# Patient Record
Sex: MALE | Race: OTHER | Employment: FULL TIME | URBAN - METROPOLITAN AREA
[De-identification: names, ages, dates, MRNs, and addresses within clinical notes are randomized per-mention and may not be internally consistent; named-entity substitution may affect disease eponyms.]

---

## 2017-07-14 PROBLEM — Z13.31 DEPRESSION SCREENING: Status: ACTIVE | Noted: 2017-07-14

## 2017-07-15 NOTE — PATIENT INSTRUCTIONS
Reacción Ante El Estrés [Stress Reaction]  La ansiedad (anxiety) es hien sensación que todos tenemos cuando creemos que algo rishi puede llegar a pasar. Es Marilou Schleicher respuesta normal ante el estrés y, por lo general, sólo causa hien reacción leve.  Cuando la Valentina 4) Desafortunadamente, hay muchas situaciones estresantes que no se pueden evitar. Es necesario que aprenda a MANEJAR MEJOR EL ESTRÉS. Hay varios métodos comprobados que le permitirán reducir la ansiedad.  Por ejemplo, cosas simples christi hacer ejercicio, te

## 2017-07-15 NOTE — PROGRESS NOTES
HPI:    Patient ID: Indu Lee is a 40year old male. Anxiety   This is a recurrent problem. The current episode started more than 1 year ago. The problem occurs intermittently. The problem has been waxing and waning.  Associated symptoms include abdo ALPRAZolam 0.5 MG Oral Tab Take 1 tablet (0.5 mg total) by mouth 2 (two) times daily as needed for Anxiety. Disp: 30 tablet Rfl: 2   RaNITidine HCl ([facility] RANITIDINE) 75 MG Oral Tab Take 1 tablet (75 mg total) by mouth 2 (two) times daily.  Disp: 60 tablet or pleasure in doing things (over the last two weeks)?: Several days    Feeling down, depressed, or hopeless (over the last two weeks)?: Several days    PHQ-2 SCORE: 2        . Encounter for routine laboratory testing  - CBC, PLATELET; NO DIFFERENTIAL; Fut

## 2017-08-14 ENCOUNTER — NURSE ONLY (OUTPATIENT)
Dept: FAMILY MEDICINE CLINIC | Facility: CLINIC | Age: 47
End: 2017-08-14

## 2017-08-14 DIAGNOSIS — Z00.00 LABORATORY EXAMINATION ORDERED AS PART OF A ROUTINE GENERAL MEDICAL EXAMINATION: Primary | ICD-10-CM

## 2017-08-14 LAB
APPEARANCE: CLEAR
MULTISTIX LOT#: NORMAL NUMERIC
PH, URINE: 6 (ref 4.5–8)
SPECIFIC GRAVITY: 1.02 (ref 1–1.03)
URINE-COLOR: YELLOW
UROBILINOGEN,SEMI-QN: 0.2 MG/DL (ref 0–1.9)

## 2017-08-14 PROCEDURE — 36415 COLL VENOUS BLD VENIPUNCTURE: CPT

## 2017-08-14 PROCEDURE — 81003 URINALYSIS AUTO W/O SCOPE: CPT

## 2017-08-15 LAB
AMB EXT CHOL/HDL RATIO: 3.3
AMB EXT CHOLESTEROL, TOTAL: 153 MG/DL
AMB EXT CREATININE: 0.89 MG/DL
AMB EXT GLUCOSE: 107 MG/DL
AMB EXT HDL CHOLESTEROL: 46 MG/DL
AMB EXT LDL CHOLESTEROL, DIRECT: 87 MG/DL
AMB EXT NON HDL CHOL: 107 MG/DL
AMB EXT TRIGLYCERIDES: 102 MG/DL

## 2017-08-16 DIAGNOSIS — R14.0 ABDOMINAL DISTENSION (GASEOUS): ICD-10-CM

## 2017-08-18 ENCOUNTER — OFFICE VISIT (OUTPATIENT)
Dept: FAMILY MEDICINE CLINIC | Facility: CLINIC | Age: 47
End: 2017-08-18

## 2017-08-18 VITALS
WEIGHT: 248 LBS | BODY MASS INDEX: 38.47 KG/M2 | HEIGHT: 67.5 IN | HEART RATE: 79 BPM | DIASTOLIC BLOOD PRESSURE: 70 MMHG | OXYGEN SATURATION: 97 % | SYSTOLIC BLOOD PRESSURE: 128 MMHG

## 2017-08-18 DIAGNOSIS — Z00.01 ENCOUNTER FOR ROUTINE ADULT HEALTH EXAMINATION WITH ABNORMAL FINDINGS: Primary | ICD-10-CM

## 2017-08-18 DIAGNOSIS — R14.0 ABDOMINAL DISTENSION (GASEOUS): ICD-10-CM

## 2017-08-18 DIAGNOSIS — F41.9 ANXIETY: ICD-10-CM

## 2017-08-18 DIAGNOSIS — E66.09 NON MORBID OBESITY DUE TO EXCESS CALORIES: ICD-10-CM

## 2017-08-18 DIAGNOSIS — E78.00 HYPERCHOLESTEREMIA: ICD-10-CM

## 2017-08-18 DIAGNOSIS — R73.09 ELEVATED GLUCOSE LEVEL: ICD-10-CM

## 2017-08-18 PROCEDURE — 99396 PREV VISIT EST AGE 40-64: CPT

## 2017-08-18 RX ORDER — DICYCLOMINE HYDROCHLORIDE 10 MG/1
CAPSULE ORAL
Qty: 90 CAPSULE | Refills: 0 | Status: SHIPPED | OUTPATIENT
Start: 2017-08-18 | End: 2018-03-01

## 2017-08-19 PROBLEM — R73.09 ELEVATED GLUCOSE LEVEL: Status: ACTIVE | Noted: 2017-08-19

## 2017-08-19 PROBLEM — Z00.01 ENCOUNTER FOR ROUTINE ADULT HEALTH EXAMINATION WITH ABNORMAL FINDINGS: Status: ACTIVE | Noted: 2017-08-19

## 2017-08-19 NOTE — PROGRESS NOTES
CC: Annual Physical Exam     HPI:   Carmen Corona is a 40year old male who presents for a complete physical exam. Pt denies any complaints at this time.     Wt Readings from Last 6 Encounters:  08/18/17 : 248 lb  07/14/17 : 249 lb  12/16/16 : 245 lb  09/01 socially     Occ: employed. : yes. Children: 1 daughter and 2 sons. Exercise: minimal.  Diet: watches minimally     REVIEW OF SYSTEMS:   CONSTITUTIONAL:  Denies unusual weight gain/loss, fever, chills, or fatigue.   EENT:  Eyes:  Denies eye pain, v Ears: TM's clear and intact bilaterally, no excess cerumen or erythema. Nose: patent, no nasal discharge Throat:  No tonsillar erythema or exudate. Mouth:  No oral lesions or ulcerations, good dentition.   NECK: Supple, no CLAD, no JVD, no carotid bruit, n patient indicates understanding of these issues and agrees to the plan.   The patient is asked to return in 2 months for f/u of anxiety, during the flu season for immunization, and in 1 year for annual physical exam.

## 2017-08-19 NOTE — PATIENT INSTRUCTIONS
Pautas de prevención para hombres de 36 a 52 años de [address] [facility] de detección y las vacunas son importantes para el manejo de ahmadi ricardo.  La consejería sobre ahmadi ricardo también es esencial. A continuación, verá pautas en relación con esos temas para ho Sífilis Los hombres con mayor riesgo de infección; hable con ahmadi proveedor de [employer] médica En los exámenes de rutina   Tuberculosis Los hombres con mayor riesgo de infección; hable con ahmadi proveedor de atención médica Consulte a ahmadi proveedor de atención m PCV13: hien dosis [facility] 19 y 72 años de edad (protege contra 13 tipos de bacteria neumocócica)    PPSV23: Bryant Munson a dos dosis Ignacio 60 años de [place], o hien dosis a partir de los 65 años (protege contra 23 tipos de bacteria neumocócica)   Refuerzo de téta

## 2017-10-19 DIAGNOSIS — F41.9 ANXIETY: ICD-10-CM

## 2017-10-20 RX ORDER — ESCITALOPRAM OXALATE 20 MG/1
TABLET ORAL
Qty: 90 TABLET | Refills: 0 | OUTPATIENT
Start: 2017-10-20

## 2018-01-22 ENCOUNTER — TELEPHONE (OUTPATIENT)
Dept: FAMILY MEDICINE CLINIC | Facility: CLINIC | Age: 48
End: 2018-01-22

## 2018-01-22 RX ORDER — AZITHROMYCIN 250 MG/1
TABLET, FILM COATED ORAL
Qty: 6 TABLET | Refills: 0 | Status: SHIPPED | OUTPATIENT
Start: 2018-01-22 | End: 2018-03-01 | Stop reason: ALTCHOICE

## 2018-01-22 NOTE — TELEPHONE ENCOUNTER
Pt called c/o chest and sinus congestion, cough, sore throat, fever and body aches. Pt states he has been taking otc dayquil and nyquil and has not been helping with symptoms.

## 2018-01-23 NOTE — TELEPHONE ENCOUNTER
Message left for him to call us back if needed, Dr Dea Saleem authorize a Raydell Mason to his pharmacy.

## 2018-03-01 PROBLEM — H91.93 BILATERAL HEARING LOSS: Status: ACTIVE | Noted: 2018-03-01

## 2018-03-02 NOTE — PATIENT INSTRUCTIONS
La respuesta del cuerpo a la ansiedad     La ansiedad normal es parte del sistema de defensa natural del cuerpo. Es hien alerta de que estamos ante hien amenaza desconocida, poco precisa o que proviene de nuestros propios miedos internos.  Mientras usted es La ansiedad puede convertirse en un problema cuando se vuelve difícil de controlar, ocurre por meses e interfiere con partes importantes de ahmadi teressa.  Si tiene un trastorno de ansiedad, el cuerpo responde de la manera descrita anteriormente hayden de forma hailey · Tenga presente que usted no lo puede controlar todo en hien situación. Cambie lo que pueda y deje que lo demás siga ahmadi curso. · Lupillo ejercicio: es hien forma excelente de aliviar la tensión y ayudar a que ahmadi cuerpo se relaje.   · Evite la cafeína y la corey

## 2018-03-02 NOTE — PROGRESS NOTES
HPI:    Patient ID: Eloisa Baptiste is a 56year old male. Anxiety   This is a recurrent problem. The current episode started more than 1 year ago. The problem occurs intermittently.  The problem has been waxing and waning (worsening over the past few laura negative. Current Outpatient Prescriptions:  escitalopram 20 MG Oral Tab Take 1 tablet (20 mg total) by mouth daily.  Disp: 90 tablet Rfl: 0   ALPRAZolam 0.5 MG Oral Tab Take 1 tablet (0.5 mg total) by mouth 2 (two) times daily as needed for Anx 100, and Praise yourself) reviewed and discussed with pt. Refills  for Escitalopram and Alprazolam given. 2. Bilateral hearing loss, unspecified hearing loss type  Clinical course, prognosis, and treatment options of disease process discussed with pt.

## 2018-06-17 DIAGNOSIS — F41.9 ANXIETY: ICD-10-CM

## 2018-06-18 RX ORDER — ESCITALOPRAM OXALATE 20 MG/1
20 TABLET ORAL DAILY
Qty: 15 TABLET | Refills: 0 | Status: SHIPPED
Start: 2018-06-18 | End: 2018-08-24

## 2018-06-18 NOTE — TELEPHONE ENCOUNTER
From: Perez Nuno  Sent: 6/17/2018 2:05 PM CDT  Subject: Medication Renewal Request    Perez Nuno would like a refill of the following medications:     escitalopram 20 MG Oral Tab Saul Diaz MD]    Preferred pharmacy: [place] [phone]

## 2018-08-24 ENCOUNTER — OFFICE VISIT (OUTPATIENT)
Dept: FAMILY MEDICINE CLINIC | Facility: CLINIC | Age: 48
End: 2018-08-24
Payer: COMMERCIAL

## 2018-08-24 VITALS
DIASTOLIC BLOOD PRESSURE: 72 MMHG | WEIGHT: 246 LBS | SYSTOLIC BLOOD PRESSURE: 120 MMHG | OXYGEN SATURATION: 98 % | BODY MASS INDEX: 37 KG/M2 | HEART RATE: 75 BPM

## 2018-08-24 DIAGNOSIS — R73.01 IMPAIRED FASTING GLUCOSE: ICD-10-CM

## 2018-08-24 DIAGNOSIS — L91.8 CUTANEOUS SKIN TAGS: ICD-10-CM

## 2018-08-24 DIAGNOSIS — E78.00 HYPERCHOLESTEREMIA: ICD-10-CM

## 2018-08-24 DIAGNOSIS — E66.09 NON MORBID OBESITY DUE TO EXCESS CALORIES: ICD-10-CM

## 2018-08-24 DIAGNOSIS — F41.9 ANXIETY: ICD-10-CM

## 2018-08-24 DIAGNOSIS — Z00.01 ENCOUNTER FOR ROUTINE ADULT HEALTH EXAMINATION WITH ABNORMAL FINDINGS: Primary | ICD-10-CM

## 2018-08-24 DIAGNOSIS — H91.93 BILATERAL HEARING LOSS, UNSPECIFIED HEARING LOSS TYPE: ICD-10-CM

## 2018-08-24 DIAGNOSIS — M54.2 NECK PAIN: ICD-10-CM

## 2018-08-24 DIAGNOSIS — R53.83 FATIGUE, UNSPECIFIED TYPE: ICD-10-CM

## 2018-08-24 DIAGNOSIS — Z13.31 DEPRESSION SCREENING: ICD-10-CM

## 2018-08-24 DIAGNOSIS — R14.0 ABDOMINAL DISTENSION (GASEOUS): ICD-10-CM

## 2018-08-24 PROCEDURE — 36415 COLL VENOUS BLD VENIPUNCTURE: CPT

## 2018-08-24 PROCEDURE — 99213 OFFICE O/P EST LOW 20 MIN: CPT

## 2018-08-24 PROCEDURE — 99396 PREV VISIT EST AGE 40-64: CPT

## 2018-08-24 RX ORDER — DICYCLOMINE HYDROCHLORIDE 10 MG/1
10 CAPSULE ORAL 4 TIMES DAILY PRN
Qty: 90 CAPSULE | Refills: 0 | Status: SHIPPED | OUTPATIENT
Start: 2018-08-24 | End: 2018-12-27

## 2018-08-24 RX ORDER — ESCITALOPRAM OXALATE 20 MG/1
20 TABLET ORAL DAILY
Qty: 90 TABLET | Refills: 0 | Status: SHIPPED | OUTPATIENT
Start: 2018-08-24 | End: 2018-12-27

## 2018-08-24 RX ORDER — CYCLOBENZAPRINE HCL 10 MG
10 TABLET ORAL 3 TIMES DAILY PRN
Qty: 15 TABLET | Refills: 0 | Status: SHIPPED | OUTPATIENT
Start: 2018-08-24 | End: 2018-10-17

## 2018-08-24 RX ORDER — ASPIRIN 81 MG/1
81 TABLET ORAL DAILY
Qty: 90 TABLET | Refills: 3 | Status: SHIPPED | OUTPATIENT
Start: 2018-08-24 | End: 2019-08-26

## 2018-08-24 RX ORDER — METHYLPREDNISOLONE 4 MG/1
TABLET ORAL
Qty: 1 KIT | Refills: 0 | Status: SHIPPED | OUTPATIENT
Start: 2018-08-24 | End: 2018-08-31 | Stop reason: ALTCHOICE

## 2018-08-24 NOTE — PROGRESS NOTES
CC: Annual Physical Exam     HPI:   Ariadne Waters is a 56year old male who presents for a complete physical exam. Pt complains of intermittent pain on the R side of his neck radiating to his shoulder blade for the past month.  He denies any trauma to the a Son    • Diabetes Brother    • No Known Problems Son       Social History:  Smoking status: Never Smoker                                                              Smokeless tobacco: Never Used                      Alcohol use:  Yes              Comment: 246 lb. Vital signs reviewed. Appears stated age, well groomed. Physical Exam:  GEN:  Patient is alert, awake and oriented, well developed, well nourished, no apparent distress.   HEENT:  Head:  Normocephalic, atraumatic Eyes: EOMI, PERRLA, no scleral ict (ASPIRIN EC LOW DOSE) 81 MG Oral Tab EC; Take 1 tablet (81 mg total) by mouth daily. Dispense: 90 tablet; Refill: 3    2. Neck pain  - Pt advised to take hot showers/baths and/or to apply a heating pad to affected area to help relieve his symptoms.   - met interest or pleasure in doing things (over the last two weeks)?: Not at all    Feeling down, depressed, or hopeless (over the last two weeks)?: Not at all    PHQ-2 SCORE: 0        11. Non morbid obesity due to excess calories  12.  BMI 37.0-37.9, adult  - P

## 2018-08-25 PROBLEM — E55.9 VITAMIN D DEFICIENCY: Status: ACTIVE | Noted: 2018-08-24

## 2018-08-25 LAB
ALBUMIN/GLOBULIN RATIO: 1.4 (CALC) (ref 1–2.5)
ALBUMIN: 4.2 G/DL (ref 3.6–5.1)
ALKALINE PHOSPHATASE: 65 U/L (ref 40–115)
ALT: 99 U/L (ref 9–46)
APPEARANCE: CLEAR
AST: 52 U/L (ref 10–40)
BILIRUBIN, TOTAL: 0.6 MG/DL (ref 0.2–1.2)
BILIRUBIN: NEGATIVE
BUN: 17 MG/DL (ref 7–25)
CALCIUM: 9.3 MG/DL (ref 8.6–10.3)
CARBON DIOXIDE: 20 MMOL/L (ref 20–32)
CHLORIDE: 102 MMOL/L (ref 98–110)
CHOL/HDLC RATIO: 3.1 (CALC)
CHOLESTEROL, TOTAL: 154 MG/DL
COLOR: YELLOW
CREATININE: 0.85 MG/DL (ref 0.6–1.35)
EGFR IF AFRICN AM: 121 ML/MIN/1.73M2
EGFR IF NONAFRICN AM: 104 ML/MIN/1.73M2
GLOBULIN: 2.9 G/DL (CALC) (ref 1.9–3.7)
GLUCOSE: 94 MG/DL (ref 65–99)
GLUCOSE: NEGATIVE
HDL CHOLESTEROL: 49 MG/DL
HEMATOCRIT: 42.9 % (ref 38.5–50)
HEMOGLOBIN: 14.3 G/DL (ref 13.2–17.1)
KETONES: NEGATIVE
LDL-CHOLESTEROL: 88 MG/DL (CALC)
LEUKOCYTE ESTERASE: NEGATIVE
MCH: 28.3 PG (ref 27–33)
MCHC: 33.3 G/DL (ref 32–36)
MCV: 84.8 FL (ref 80–100)
MPV: 9.8 FL (ref 7.5–12.5)
NITRITE: NEGATIVE
NON-HDL CHOLESTEROL: 105 MG/DL (CALC)
OCCULT BLOOD: NEGATIVE
PH: 6.5 (ref 5–8)
PLATELET COUNT: 297 THOUSAND/UL (ref 140–400)
POTASSIUM: 4.2 MMOL/L (ref 3.5–5.3)
PROTEIN, TOTAL: 7.1 G/DL (ref 6.1–8.1)
PROTEIN: NEGATIVE
RDW: 13.2 % (ref 11–15)
RED BLOOD CELL COUNT: 5.06 MILLION/UL (ref 4.2–5.8)
SODIUM: 135 MMOL/L (ref 135–146)
SPECIFIC GRAVITY: 1.01 (ref 1–1.03)
TRIGLYCERIDES: 79 MG/DL
TSH W/REFLEX TO FT4: 3.75 MIU/L (ref 0.4–4.5)
VITAMIN D, 25-OH, TOTAL: 14 NG/ML (ref 30–100)
WHITE BLOOD CELL COUNT: 8.2 THOUSAND/UL (ref 3.8–10.8)

## 2018-08-25 NOTE — PATIENT INSTRUCTIONS
Pautas de prevención para hombres de 36 a 52 años de [address] [facility] de detección y las vacunas son importantes para el manejo de ahmadi ricardo.  La consejería sobre ahmadi ricardo también es esencial. A continuación, verá pautas en relación con esos temas para ho Sífilis Los hombres con mayor riesgo de infección; hable con ahmadi proveedor de [employer] médica En los exámenes de rutina   Tuberculosis Los hombres con mayor riesgo de infección; hable con ahmadi proveedor de atención médica Consulte a ahmadi proveedor de atención m PCV13: hien dosis [facility] 19 y 72 años de edad (protege contra 13 tipos de bacteria neumocócica)    PPSV23: Marybeth Keaton a dos dosis [employer] 60 años de [place], o hien dosis a partir de los 65 años (protege contra 23 tipos de bacteria neumocócica)   Refuerzo de téta

## 2018-08-27 ENCOUNTER — TELEPHONE (OUTPATIENT)
Dept: FAMILY MEDICINE CLINIC | Facility: CLINIC | Age: 48
End: 2018-08-27

## 2018-08-27 RX ORDER — CLOTRIMAZOLE AND BETAMETHASONE DIPROPIONATE 10; .64 MG/G; MG/G
1 CREAM TOPICAL 2 TIMES DAILY
Qty: 15 G | Refills: 1 | Status: SHIPPED | OUTPATIENT
Start: 2018-08-27 | End: 2018-12-27 | Stop reason: ALTCHOICE

## 2018-08-27 NOTE — TELEPHONE ENCOUNTER
----- Message from Catherine Zendejas MD sent at 8/25/2018  5:21 PM CDT -----  Start Ergocalciferol 2500units PO daily, medication e-prescribed, repeat 25-OH vitamin D in 3 months; ok to file.

## 2018-08-31 ENCOUNTER — OFFICE VISIT (OUTPATIENT)
Dept: FAMILY MEDICINE CLINIC | Facility: CLINIC | Age: 48
End: 2018-08-31
Payer: COMMERCIAL

## 2018-08-31 VITALS
BODY MASS INDEX: 38.16 KG/M2 | SYSTOLIC BLOOD PRESSURE: 114 MMHG | HEIGHT: 67.5 IN | DIASTOLIC BLOOD PRESSURE: 70 MMHG | HEART RATE: 87 BPM | OXYGEN SATURATION: 97 % | WEIGHT: 246 LBS

## 2018-08-31 DIAGNOSIS — L91.8 CUTANEOUS SKIN TAGS: Primary | ICD-10-CM

## 2018-08-31 PROBLEM — R53.83 FATIGUE: Status: RESOLVED | Noted: 2018-08-24 | Resolved: 2018-08-31

## 2018-08-31 PROBLEM — Z00.01 ENCOUNTER FOR ROUTINE ADULT HEALTH EXAMINATION WITH ABNORMAL FINDINGS: Status: RESOLVED | Noted: 2017-08-19 | Resolved: 2018-08-31

## 2018-08-31 PROBLEM — Z13.31 DEPRESSION SCREENING: Status: RESOLVED | Noted: 2017-07-14 | Resolved: 2018-08-31

## 2018-08-31 PROBLEM — M54.2 NECK PAIN: Status: RESOLVED | Noted: 2018-08-24 | Resolved: 2018-08-31

## 2018-08-31 PROCEDURE — 11200 RMVL SKIN TAGS UP TO&INC 15: CPT

## 2018-08-31 PROCEDURE — 11201 RMVL SKIN TAGS EA ADDL 10: CPT

## 2018-09-01 NOTE — PROCEDURES
CHIEF COMPLAINT   Patient presents for skin tag removal  HPI     VITALS   /70 (BP Location: Left arm, Patient Position: Sitting, Cuff Size: adult)   Pulse 87   Ht 67.5\"   Wt 246 lb   SpO2 97%   BMI 37.96 kg/m²   PROCEDURE   Skin tag removal. Verbal

## 2018-09-06 ENCOUNTER — OFFICE VISIT (OUTPATIENT)
Dept: OTOLARYNGOLOGY | Facility: CLINIC | Age: 48
End: 2018-09-06
Payer: COMMERCIAL

## 2018-09-06 ENCOUNTER — OFFICE VISIT (OUTPATIENT)
Dept: AUDIOLOGY | Facility: CLINIC | Age: 48
End: 2018-09-06
Payer: COMMERCIAL

## 2018-09-06 VITALS
TEMPERATURE: 99 F | SYSTOLIC BLOOD PRESSURE: 112 MMHG | WEIGHT: 246 LBS | HEIGHT: 67.5 IN | DIASTOLIC BLOOD PRESSURE: 70 MMHG | BODY MASS INDEX: 38.16 KG/M2

## 2018-09-06 DIAGNOSIS — H90.3 SENSORINEURAL HEARING LOSS, BILATERAL: Primary | ICD-10-CM

## 2018-09-06 DIAGNOSIS — H90.3 SENSORINEURAL HEARING LOSS (SNHL) OF BOTH EARS: Primary | ICD-10-CM

## 2018-09-06 PROBLEM — H83.3X3 NOISE-INDUCED HEARING LOSS OF BOTH EARS: Status: ACTIVE | Noted: 2018-03-01

## 2018-09-06 PROCEDURE — 92567 TYMPANOMETRY: CPT | Performed by: AUDIOLOGIST

## 2018-09-06 PROCEDURE — 99243 OFF/OP CNSLTJ NEW/EST LOW 30: CPT | Performed by: OTOLARYNGOLOGY

## 2018-09-06 PROCEDURE — 92557 COMPREHENSIVE HEARING TEST: CPT | Performed by: AUDIOLOGIST

## 2018-09-06 PROCEDURE — 99212 OFFICE O/P EST SF 10 MIN: CPT | Performed by: OTOLARYNGOLOGY

## 2018-09-06 NOTE — PROGRESS NOTES
Thai Rachel is a 56year old male. Patient presents with:  Hearing Loss: decreased hearing of both ears for a year      HISTORY OF PRESENT ILLNESS  He presents with a history of worsening hearing loss in the past year.   He has no tinnitus or other signs 7.5\" (1.715 m)   Wt 246 lb (111.6 kg)   BMI 37.96 kg/m²        Constitutional Normal Overall appearance - Normal.   Psychiatric Normal Orientation - Oriented to time, place, person & situation. Appropriate mood and affect.    Neck Exam Normal Inspection - Oral Tab, Take 1 tablet (10 mg total) by mouth 3 (three) times daily as needed for Muscle spasms. , Disp: 15 tablet, Rfl: 0  •  ALPRAZolam 0.5 MG Oral Tab, Take 1 tablet (0.5 mg total) by mouth 2 (two) times daily as needed for Anxiety. , Disp: 30 tablet, Rf

## 2018-09-07 NOTE — PROGRESS NOTES
AUDIOLOGY REPORT      Cayla Fang is a 56year old male     Referring Provider: Roxana Messina   YOB: 1970  Medical Record: [id number]      Patient Hearing History:   Patient reported history of work with loud impact tools.    He is noticin

## 2018-10-17 DIAGNOSIS — M54.2 NECK PAIN: ICD-10-CM

## 2018-10-23 RX ORDER — CYCLOBENZAPRINE HCL 10 MG
10 TABLET ORAL 3 TIMES DAILY PRN
Qty: 15 TABLET | Refills: 0 | Status: SHIPPED | OUTPATIENT
Start: 2018-10-23 | End: 2018-12-27 | Stop reason: ALTCHOICE

## 2018-11-24 DIAGNOSIS — F41.9 ANXIETY: ICD-10-CM

## 2018-11-26 RX ORDER — ESCITALOPRAM OXALATE 20 MG/1
20 TABLET ORAL DAILY
Qty: 90 TABLET | Refills: 0 | OUTPATIENT
Start: 2018-11-26

## 2018-11-27 DIAGNOSIS — F41.9 ANXIETY: ICD-10-CM

## 2018-11-28 RX ORDER — ESCITALOPRAM OXALATE 20 MG/1
20 TABLET ORAL DAILY
Qty: 90 TABLET | Refills: 0 | OUTPATIENT
Start: 2018-11-28

## 2018-12-27 PROBLEM — R09.82 POSTNASAL DRIP: Status: ACTIVE | Noted: 2018-12-27

## 2018-12-27 PROBLEM — R06.81 APNEIC EPISODE: Status: ACTIVE | Noted: 2018-12-27

## 2018-12-28 NOTE — PATIENT INSTRUCTIONS
Continuous Positive Airway Pressure (CPAP)  Your healthcare provider has prescribed continuous positive airway pressure (CPAP) therapy for you. A CPAP device helps you breathe better at night.  The device sends air through your nose or mouth when you marco · Learn to clean and maintain the device  · Adjust to regular use of the CPAP     The CPAP device settings are given as centimeters of water, or cm/H2O. Each person’s pressure settings are different.  Your healthcare provider will tell you what settings to Podrían recetarle ciertos medicamentos para ayudar a controlarle los síntomas y [employer] ansiedad. También es posible hacer progresos marylu a la terapia.  Quizás al principio tengan que ajustarle los medicamentos y odette dosis hasta encontrar la combi · Medicamentos antidepresivos: Micheline tipo de medicamento suele recetarse también para tratar la ansiedad, incluso en personas que no están deprimidas.  Los antidepresivos restablecen el equilibrio de las sustancias químicas del cerebro, lo cual ayuda a contr · Efectos secundarios: Los medicamentos pueden provocar efectos secundarios. Pregunte a ahmadi médico o farmacéutico lo que puede esperar; yanni vez le puedan bola ideas sobre cómo evitar algunos efectos secundarios.   · Trastornos sexuales: Algunos antidepresivos

## 2018-12-28 NOTE — PROGRESS NOTES
HPI:    Patient ID: Eloisa Baptiste is a 53year old male. Anxiety   This is a chronic problem. Episode onset: few years ago. The problem occurs intermittently. The problem has been waxing and waning.  Associated symptoms include chest pain, congestion, co Positive for fever. Negative for activity change, appetite change, chills, diaphoresis, fatigue, unexpected weight change and weight loss. HENT: Positive for congestion, hearing loss, rhinorrhea and sore throat.  Negative for ear discharge, ear pain and t Allergies:No Known Allergies   PHYSICAL EXAM:   Physical Exam   Constitutional: He is oriented to person, place, and time. He appears well-developed and well-nourished. No distress.    HENT:   Right Ear: Hearing, tympanic membrane, external ear and ear given.    3. Apneic episode  Will obtain sleep study and f/u as appropriate. 4. Abdominal distension (gaseous)  Refill for Dicyclomine given. RTC if symptoms worsen or fail to improve and in 3 months for f/u of anxiety.       No orders of the defined

## 2019-04-15 ENCOUNTER — TELEPHONE (OUTPATIENT)
Dept: FAMILY MEDICINE CLINIC | Facility: CLINIC | Age: 49
End: 2019-04-15

## 2019-04-15 DIAGNOSIS — F41.9 ANXIETY: ICD-10-CM

## 2019-04-15 RX ORDER — PAROXETINE HYDROCHLORIDE 20 MG/1
20 TABLET, FILM COATED ORAL EVERY MORNING
Qty: 90 TABLET | Refills: 0 | OUTPATIENT
Start: 2019-04-15

## 2019-04-23 ENCOUNTER — OFFICE VISIT (OUTPATIENT)
Dept: FAMILY MEDICINE CLINIC | Facility: CLINIC | Age: 49
End: 2019-04-23
Payer: COMMERCIAL

## 2019-04-23 VITALS — BODY MASS INDEX: 38 KG/M2 | SYSTOLIC BLOOD PRESSURE: 110 MMHG | DIASTOLIC BLOOD PRESSURE: 80 MMHG | WEIGHT: 243.19 LBS

## 2019-04-23 DIAGNOSIS — N30.01 ACUTE CYSTITIS WITH HEMATURIA: Primary | ICD-10-CM

## 2019-04-23 DIAGNOSIS — R14.0 ABDOMINAL DISTENSION (GASEOUS): ICD-10-CM

## 2019-04-23 DIAGNOSIS — R06.81 APNEIC EPISODE: ICD-10-CM

## 2019-04-23 DIAGNOSIS — M54.2 NECK PAIN: ICD-10-CM

## 2019-04-23 DIAGNOSIS — F41.9 ANXIETY: ICD-10-CM

## 2019-04-23 DIAGNOSIS — M54.6 ACUTE RIGHT-SIDED THORACIC BACK PAIN: ICD-10-CM

## 2019-04-23 DIAGNOSIS — R30.0 DYSURIA: ICD-10-CM

## 2019-04-23 PROBLEM — R09.82 POSTNASAL DRIP: Status: RESOLVED | Noted: 2018-12-27 | Resolved: 2019-04-23

## 2019-04-23 PROCEDURE — 99214 OFFICE O/P EST MOD 30 MIN: CPT

## 2019-04-23 PROCEDURE — 81003 URINALYSIS AUTO W/O SCOPE: CPT

## 2019-04-23 RX ORDER — LEVOFLOXACIN 750 MG/1
750 TABLET ORAL DAILY
Qty: 5 TABLET | Refills: 0 | Status: SHIPPED | OUTPATIENT
Start: 2019-04-23 | End: 2019-08-26 | Stop reason: ALTCHOICE

## 2019-04-23 RX ORDER — ESCITALOPRAM OXALATE 20 MG/1
20 TABLET ORAL DAILY
Qty: 90 TABLET | Refills: 0 | Status: SHIPPED | OUTPATIENT
Start: 2019-04-23 | End: 2019-08-26

## 2019-04-24 NOTE — PATIENT INSTRUCTIONS
Los trastornos de ansiedad    Aparna todo el marcela siente nerviosismo de vez en cuando. Es normal tener un nudo en el estómago antes de un examen, o el pulso acelerado la primera vez que yessica sale con alguien.  Shayna un trastorno de ansiedad es [employer] Trastornos de ansiedad frecuentes  · Trastorno de pánico: Causa un miedo intenso de encontrarse en peligro. · Fobias: Miedos extremos a ciertos objetos, lugares o eventos.   · Trastorno obsesivo-compulsivo: Causa pensamientos indeseables y, a veces, Patrice Ip

## 2019-04-24 NOTE — PROGRESS NOTES
HPI:    Patient ID: Tim Brumfield is a 53year old male. Anxiety   This is a chronic problem. Episode onset: few years ago. The problem occurs intermittently. The problem has been waxing and waning.  Associated symptoms include abdominal pain, arthralgia acetaminophen for the symptoms. The treatment provided moderate relief. Review of Systems   Constitutional: Positive for diaphoresis. Negative for activity change, appetite change, chills, fatigue, fever, unexpected weight change and weight loss.    H 0   aspirin (ASPIRIN EC LOW DOSE) 81 MG Oral Tab EC Take 1 tablet (81 mg total) by mouth daily. Disp: 90 tablet Rfl: 3   Ergocalciferol 2500 units Oral Cap Take 1 capsule by mouth daily.  Disp: 90 capsule Rfl: 0     Allergies:No Known Allergies   PHYSICAL E technique (Stop what you are doing, Talk yourself down, Observe and count to 100, and Praise yourself) reviewed and discussed with pt. Paxil d/wendy'ed and pt restarted on Escitalopram 20mg PO daily. 4. Neck pain  5.  Acute right-sided thoracic back pain  R

## 2019-05-18 DIAGNOSIS — R14.0 ABDOMINAL DISTENSION (GASEOUS): ICD-10-CM

## 2019-08-26 ENCOUNTER — OFFICE VISIT (OUTPATIENT)
Dept: FAMILY MEDICINE CLINIC | Facility: CLINIC | Age: 49
End: 2019-08-26
Payer: COMMERCIAL

## 2019-08-26 VITALS
BODY MASS INDEX: 36.45 KG/M2 | HEIGHT: 67.5 IN | SYSTOLIC BLOOD PRESSURE: 110 MMHG | OXYGEN SATURATION: 98 % | HEART RATE: 75 BPM | WEIGHT: 235 LBS | DIASTOLIC BLOOD PRESSURE: 70 MMHG

## 2019-08-26 DIAGNOSIS — R06.81 APNEIC EPISODE: ICD-10-CM

## 2019-08-26 DIAGNOSIS — E78.00 HYPERCHOLESTEREMIA: ICD-10-CM

## 2019-08-26 DIAGNOSIS — E66.01 CLASS 2 SEVERE OBESITY DUE TO EXCESS CALORIES WITH SERIOUS COMORBIDITY AND BODY MASS INDEX (BMI) OF 36.0 TO 36.9 IN ADULT (HCC): ICD-10-CM

## 2019-08-26 DIAGNOSIS — M54.50 CHRONIC BILATERAL LOW BACK PAIN WITHOUT SCIATICA: ICD-10-CM

## 2019-08-26 DIAGNOSIS — Z00.01 ENCOUNTER FOR ROUTINE ADULT HEALTH EXAMINATION WITH ABNORMAL FINDINGS: Primary | ICD-10-CM

## 2019-08-26 DIAGNOSIS — R79.89 ELEVATED LFTS: ICD-10-CM

## 2019-08-26 DIAGNOSIS — G89.29 CHRONIC BILATERAL LOW BACK PAIN WITHOUT SCIATICA: ICD-10-CM

## 2019-08-26 DIAGNOSIS — Z79.82 LONG TERM CURRENT USE OF ASPIRIN: ICD-10-CM

## 2019-08-26 DIAGNOSIS — E55.9 VITAMIN D DEFICIENCY: ICD-10-CM

## 2019-08-26 DIAGNOSIS — R14.0 ABDOMINAL DISTENSION (GASEOUS): ICD-10-CM

## 2019-08-26 DIAGNOSIS — H83.3X3 NOISE-INDUCED HEARING LOSS OF BOTH EARS: ICD-10-CM

## 2019-08-26 DIAGNOSIS — Z13.31 DEPRESSION SCREENING: ICD-10-CM

## 2019-08-26 DIAGNOSIS — F41.9 ANXIETY: ICD-10-CM

## 2019-08-26 PROBLEM — R30.0 DYSURIA: Status: RESOLVED | Noted: 2019-04-23 | Resolved: 2019-08-26

## 2019-08-26 PROBLEM — N30.01 ACUTE CYSTITIS WITH HEMATURIA: Status: RESOLVED | Noted: 2019-04-23 | Resolved: 2019-08-26

## 2019-08-26 PROCEDURE — 99212 OFFICE O/P EST SF 10 MIN: CPT

## 2019-08-26 PROCEDURE — 99396 PREV VISIT EST AGE 40-64: CPT

## 2019-08-26 PROCEDURE — 36415 COLL VENOUS BLD VENIPUNCTURE: CPT

## 2019-08-26 RX ORDER — ASPIRIN 81 MG/1
81 TABLET ORAL DAILY
Qty: 90 TABLET | Refills: 3 | Status: SHIPPED | OUTPATIENT
Start: 2019-08-26 | End: 2020-03-02

## 2019-08-26 RX ORDER — ESCITALOPRAM OXALATE 20 MG/1
20 TABLET ORAL DAILY
Qty: 90 TABLET | Refills: 1 | Status: SHIPPED | OUTPATIENT
Start: 2019-08-26 | End: 2020-03-02

## 2019-08-26 RX ORDER — METHYLPREDNISOLONE 4 MG/1
TABLET ORAL
Qty: 1 KIT | Refills: 0 | Status: SHIPPED | OUTPATIENT
Start: 2019-08-26 | End: 2019-09-01

## 2019-08-26 RX ORDER — CYCLOBENZAPRINE HCL 10 MG
10 TABLET ORAL 3 TIMES DAILY PRN
Qty: 15 TABLET | Refills: 0 | Status: SHIPPED | OUTPATIENT
Start: 2019-08-26 | End: 2020-08-26

## 2019-08-27 LAB
ALBUMIN/GLOBULIN RATIO: 1.3 (CALC) (ref 1–2.5)
ALBUMIN: 4.4 G/DL (ref 3.6–5.1)
ALKALINE PHOSPHATASE: 54 U/L (ref 40–115)
ALT: 65 U/L (ref 9–46)
APPEARANCE: CLEAR
AST: 34 U/L (ref 10–40)
BILIRUBIN, TOTAL: 0.6 MG/DL (ref 0.2–1.2)
BILIRUBIN: NEGATIVE
BUN: 22 MG/DL (ref 7–25)
CALCIUM: 9.6 MG/DL (ref 8.6–10.3)
CARBON DIOXIDE: 19 MMOL/L (ref 20–32)
CHLORIDE: 106 MMOL/L (ref 98–110)
CHOL/HDLC RATIO: 3.3 (CALC)
CHOLESTEROL, TOTAL: 163 MG/DL
COLOR: YELLOW
CREATININE: 0.9 MG/DL (ref 0.6–1.35)
EGFR IF AFRICN AM: 117 ML/MIN/1.73M2
EGFR IF NONAFRICN AM: 101 ML/MIN/1.73M2
GLOBULIN: 3.3 G/DL (CALC) (ref 1.9–3.7)
GLUCOSE: 88 MG/DL (ref 65–99)
GLUCOSE: NEGATIVE
HDL CHOLESTEROL: 50 MG/DL
HEMATOCRIT: 42.6 % (ref 38.5–50)
HEMOGLOBIN: 14.4 G/DL (ref 13.2–17.1)
KETONES: NEGATIVE
LDL-CHOLESTEROL: 96 MG/DL (CALC)
LEUKOCYTE ESTERASE: NEGATIVE
MCH: 28.6 PG (ref 27–33)
MCHC: 33.8 G/DL (ref 32–36)
MCV: 84.5 FL (ref 80–100)
MPV: 9.8 FL (ref 7.5–12.5)
NITRITE: NEGATIVE
NON-HDL CHOLESTEROL: 113 MG/DL (CALC)
OCCULT BLOOD: NEGATIVE
PH: 5.5 (ref 5–8)
PLATELET COUNT: 293 THOUSAND/UL (ref 140–400)
POTASSIUM: 4.3 MMOL/L (ref 3.5–5.3)
PROTEIN, TOTAL: 7.7 G/DL (ref 6.1–8.1)
PROTEIN: NEGATIVE
RDW: 13.7 % (ref 11–15)
RED BLOOD CELL COUNT: 5.04 MILLION/UL (ref 4.2–5.8)
SODIUM: 138 MMOL/L (ref 135–146)
SPECIFIC GRAVITY: 1.03 (ref 1–1.03)
TRIGLYCERIDES: 79 MG/DL
VITAMIN D, 25-OH, TOTAL: 14 NG/ML (ref 30–100)
WHITE BLOOD CELL COUNT: 7.4 THOUSAND/UL (ref 3.8–10.8)

## 2019-08-29 DIAGNOSIS — E55.9 VITAMIN D DEFICIENCY: Primary | ICD-10-CM

## 2019-08-29 PROBLEM — M54.2 NECK PAIN: Status: RESOLVED | Noted: 2018-08-24 | Resolved: 2019-08-29

## 2019-08-29 PROBLEM — L91.8 CUTANEOUS SKIN TAGS: Status: RESOLVED | Noted: 2018-08-24 | Resolved: 2019-08-29

## 2019-08-29 PROBLEM — R79.89 ELEVATED LFTS: Status: ACTIVE | Noted: 2018-08-24

## 2019-08-29 PROBLEM — G89.29 CHRONIC BILATERAL LOW BACK PAIN WITHOUT SCIATICA: Status: ACTIVE | Noted: 2019-04-23

## 2019-08-29 PROBLEM — L91.8 CUTANEOUS SKIN TAGS: Status: RESOLVED | Noted: 2018-08-24 | Resolved: 2018-08-31

## 2019-08-29 PROBLEM — R73.01 IMPAIRED FASTING GLUCOSE: Status: RESOLVED | Noted: 2017-08-19 | Resolved: 2019-08-29

## 2019-08-29 PROBLEM — M54.50 CHRONIC BILATERAL LOW BACK PAIN WITHOUT SCIATICA: Status: ACTIVE | Noted: 2019-04-23

## 2019-08-29 NOTE — PROGRESS NOTES
CC: Annual Physical Exam     HPI:   Catherine Bustamante is a 53year old male who presents for a complete physical exam. Pt complaints of recurrent back pain across his back that is worse on his left side.  He says that his back feels stiff in the mornings and is U/L    ALT 65 (H) 9 - 46 U/L   LIPID PANEL   Result Value Ref Range    CHOLESTEROL, TOTAL 163 <200 mg/dL    HDL CHOLESTEROL 50 >40 mg/dL    TRIGLYCERIDES 79 <150 mg/dL    LDL-CHOLESTEROL 96 mg/dL (calc)    CHOL/HDLC RATIO 3.3 <5.0 (calc)    NON-HDL CHOLEST • EXCIS SPERMATOCELE Left 2007   • REMOVAL OF ADDED SKIN TAGS  8/31/2018   • REMOVAL OF SKIN TAGS  8/31/2018      Family History   Problem Relation Age of Onset   • Diabetes Father    • Hypertension Father    • Diabetes Mother    • Hypertension Mother eczema or rhinitis.     EXAM:   /70 (BP Location: Right arm, Patient Position: Sitting, Cuff Size: adult)   Pulse 75   Ht 67.5\"   Wt 235 lb   SpO2 98%   BMI 36.26 kg/m²  Estimated body mass index is 36.26 kg/m² as calculated from the following:    He findings  - Pt reassured and all questions answered. - Age/sex specific preventive measures/immunizations reviewed and discussed with pt.  - CBC, PLATELET; NO DIFFERENTIAL  - URINALYSIS, ROUTINE    2.  Hypercholesteremia  - Omega 3 fish oil pills 2-4g PO d interest or pleasure in doing things (over the last two weeks)?: Not at all    Feeling down, depressed, or hopeless (over the last two weeks)?: Not at all    PHQ-2 SCORE: 0        12.  Class 2 severe obesity due to excess calories with serious comorbidity a

## 2019-08-29 NOTE — PATIENT INSTRUCTIONS
Pautas de prevención para hombres de 36 a 52 años de [address] [facility] de detección y las vacunas son importantes para el manejo de ahmadi ricardo.  La consejería sobre ahmadi ricardo también es esencial. A continuación, verá pautas en relación con esos temas para ho Sífilis Los hombres con mayor riesgo de infección; hable con ahmadi proveedor de [employer] médica En los exámenes de rutina   Tuberculosis Los hombres con mayor riesgo de infección; hable con ahmadi proveedor de atención médica Consulte a ahmadi proveedor de atención m PCV13: hein dosis [facility] 19 y 72 años de edad (protege contra 13 tipos de bacteria neumocócica)    PPSV23: Arthjessie Vieira a dos dosis [employer] 60 años de [place], o hien dosis a partir de los 66 años (protege contra 23 tipos de bacteria neumocócica)   Refuerzo de téta

## 2019-09-05 ENCOUNTER — TELEPHONE (OUTPATIENT)
Dept: FAMILY MEDICINE CLINIC | Facility: CLINIC | Age: 49
End: 2019-09-05

## 2019-09-05 NOTE — TELEPHONE ENCOUNTER
----- Message from Ratna Hoffman MD sent at 8/29/2019 11:01 AM CDT -----  Increase Ergocalciferol to 2500 units PO daily, medication e-prescribed; needs repeat 25-OH vitamin D level in 3 months; ok to file.

## 2019-09-05 NOTE — TELEPHONE ENCOUNTER
Patient called back and results were discussed with him , all questions were answered and verbalized understanding.

## 2019-09-05 NOTE — TELEPHONE ENCOUNTER
Patient has My Chart active, we just want to make sure he saw them and that he started the new RX for his Vitamin D.

## 2019-09-29 DIAGNOSIS — F41.9 ANXIETY: ICD-10-CM

## 2019-10-02 RX ORDER — ESCITALOPRAM OXALATE 20 MG/1
TABLET ORAL
Qty: 90 TABLET | Refills: 0 | OUTPATIENT
Start: 2019-10-02

## 2019-11-12 ENCOUNTER — OFFICE VISIT (OUTPATIENT)
Dept: SLEEP CENTER | Age: 49
End: 2019-11-12
Payer: COMMERCIAL

## 2019-11-12 DIAGNOSIS — G47.33 OSA (OBSTRUCTIVE SLEEP APNEA): Primary | ICD-10-CM

## 2019-11-12 DIAGNOSIS — R06.81 APNEIC EPISODE: ICD-10-CM

## 2019-11-12 PROCEDURE — 95806 SLEEP STUDY UNATT&RESP EFFT: CPT

## 2019-11-16 NOTE — PROCEDURES
[address] [facility]  Accredited by the [place] of Sleep Medicine (AASM)    PATIENT'S NAME: Gordy Spears   ATTENDING PHYSICIAN: Michael Medina. Ciera Pritchett MD   REFERRING PHYSICIAN: Michael Pritchett MD   PATIENT ACCOUNT #: [de-identified] LOCATION:  sleep apnea (ICD-10 code G47.33). RECOMMENDATIONS:    1. CPAP titration. 2.   Weight loss. 3.   Avoid alcohol. 4.   Avoid sedating drug. 5.   Patient should not drive if at all sleepy.    6.   Download data from the respiratory device at the

## 2019-11-17 DIAGNOSIS — G47.33 OSA (OBSTRUCTIVE SLEEP APNEA): Primary | ICD-10-CM

## 2019-11-17 PROBLEM — Z00.01 ENCOUNTER FOR ROUTINE ADULT HEALTH EXAMINATION WITH ABNORMAL FINDINGS: Status: RESOLVED | Noted: 2017-08-19 | Resolved: 2019-11-17

## 2019-11-17 PROBLEM — Z13.31 DEPRESSION SCREENING: Status: RESOLVED | Noted: 2017-07-14 | Resolved: 2019-11-17

## 2020-03-02 ENCOUNTER — TELEPHONE (OUTPATIENT)
Dept: FAMILY MEDICINE CLINIC | Facility: CLINIC | Age: 50
End: 2020-03-02

## 2020-03-02 DIAGNOSIS — Z79.82 LONG TERM CURRENT USE OF ASPIRIN: ICD-10-CM

## 2020-03-02 DIAGNOSIS — F41.9 ANXIETY: ICD-10-CM

## 2020-03-02 RX ORDER — ASPIRIN 81 MG/1
81 TABLET ORAL DAILY
Qty: 30 TABLET | Refills: 0 | Status: SHIPPED | OUTPATIENT
Start: 2020-03-02 | End: 2020-03-23

## 2020-03-02 RX ORDER — ESCITALOPRAM OXALATE 20 MG/1
20 TABLET ORAL DAILY
Qty: 30 TABLET | Refills: 0 | Status: SHIPPED | OUTPATIENT
Start: 2020-03-02 | End: 2020-03-30

## 2020-03-02 NOTE — TELEPHONE ENCOUNTER
Pt called requesting partial refills for Aspirin and Escitalopram.   Pt wants to be notified when this is sent to pharmacy.

## 2020-03-02 NOTE — TELEPHONE ENCOUNTER
Refills authorized and sent to [facility].   Message left to inform him about the approval.

## 2020-03-16 ENCOUNTER — TELEPHONE (OUTPATIENT)
Dept: FAMILY MEDICINE CLINIC | Facility: CLINIC | Age: 50
End: 2020-03-16

## 2020-03-16 RX ORDER — GUAIFENESIN DEXTROMETHORPHAN HYDROBROMIDE ORAL SOLUTION 10; 100 MG/5ML; MG/5ML
5 SOLUTION ORAL EVERY 8 HOURS PRN
Qty: 118 ML | Refills: 0 | Status: SHIPPED | OUTPATIENT
Start: 2020-03-16 | End: 2020-08-26 | Stop reason: ALTCHOICE

## 2020-03-16 RX ORDER — LORATADINE AND PSEUDOEPHEDRINE 10; 240 MG/1; MG/1
1 TABLET, EXTENDED RELEASE ORAL DAILY
Qty: 7 TABLET | Refills: 0 | Status: SHIPPED | OUTPATIENT
Start: 2020-03-16 | End: 2020-03-23

## 2020-03-16 NOTE — TELEPHONE ENCOUNTER
Letter generated as per request by patient and as authorized by MD from previous telephone encounter.

## 2020-03-16 NOTE — TELEPHONE ENCOUNTER
Talked to patient. No fevers, body aches. Admitted to having the stomach flu a few weeks ago. No SOB or pain upon inspiration/exhalation. Admitted to having anxiety, and thus he takes deep breaths frequently. He has tried OTC Dayquil and Nyquil only.

## 2020-03-16 NOTE — TELEPHONE ENCOUNTER
Spoke to patient, ok per Dr. Eliane Mahmood to send Robitussin DM and Loratadine-D. E-rx to pharmacy. Patient also states that his daughter's school let the kids out early due to a potential COVID-19 exposure.   Due to this, informed patient to self-quarantine

## 2020-03-23 ENCOUNTER — TELEPHONE (OUTPATIENT)
Dept: FAMILY MEDICINE CLINIC | Facility: CLINIC | Age: 50
End: 2020-03-23

## 2020-03-23 DIAGNOSIS — Z79.82 LONG TERM CURRENT USE OF ASPIRIN: ICD-10-CM

## 2020-03-23 RX ORDER — BENZONATATE 200 MG/1
200 CAPSULE ORAL 3 TIMES DAILY PRN
Qty: 21 CAPSULE | Refills: 0 | Status: SHIPPED | OUTPATIENT
Start: 2020-03-23 | End: 2020-08-26 | Stop reason: ALTCHOICE

## 2020-03-23 NOTE — TELEPHONE ENCOUNTER
Patient had called on 03/16/2020 he has an appointment for tomorrow but still has a cough, per protocol he should not come in due to current outbreak, per patient he does not have a fever, has not traveled outside of the country in the past 14days nor has

## 2020-03-24 RX ORDER — ASPIRIN 81 MG/1
81 TABLET ORAL DAILY
Qty: 30 TABLET | Refills: 0 | Status: SHIPPED | OUTPATIENT
Start: 2020-03-24 | End: 2020-08-26

## 2020-03-28 DIAGNOSIS — F41.9 ANXIETY: ICD-10-CM

## 2020-03-30 RX ORDER — ESCITALOPRAM OXALATE 20 MG/1
TABLET ORAL
Qty: 30 TABLET | Refills: 0 | Status: SHIPPED | OUTPATIENT
Start: 2020-03-30 | End: 2020-08-26

## 2020-04-03 RX ORDER — BENZONATATE 200 MG/1
200 CAPSULE ORAL 3 TIMES DAILY PRN
Qty: 21 CAPSULE | Refills: 0 | OUTPATIENT
Start: 2020-04-03

## 2020-04-06 RX ORDER — BENZONATATE 200 MG/1
200 CAPSULE ORAL 3 TIMES DAILY PRN
Qty: 21 CAPSULE | Refills: 0 | OUTPATIENT
Start: 2020-04-06

## 2020-04-29 DIAGNOSIS — F41.9 ANXIETY: ICD-10-CM

## 2020-04-29 RX ORDER — ESCITALOPRAM OXALATE 20 MG/1
TABLET ORAL
Qty: 30 TABLET | Refills: 0 | OUTPATIENT
Start: 2020-04-29

## 2020-05-12 DIAGNOSIS — Z79.82 LONG TERM CURRENT USE OF ASPIRIN: ICD-10-CM

## 2020-05-12 RX ORDER — ASPIRIN 81 MG/1
TABLET, COATED ORAL
Qty: 30 TABLET | Refills: 0 | OUTPATIENT
Start: 2020-05-12

## 2020-08-26 ENCOUNTER — OFFICE VISIT (OUTPATIENT)
Dept: FAMILY MEDICINE CLINIC | Facility: CLINIC | Age: 50
End: 2020-08-26
Payer: COMMERCIAL

## 2020-08-26 VITALS
HEART RATE: 64 BPM | SYSTOLIC BLOOD PRESSURE: 122 MMHG | RESPIRATION RATE: 18 BRPM | WEIGHT: 242.88 LBS | OXYGEN SATURATION: 97 % | HEIGHT: 69 IN | BODY MASS INDEX: 35.97 KG/M2 | DIASTOLIC BLOOD PRESSURE: 82 MMHG

## 2020-08-26 DIAGNOSIS — F41.9 ANXIETY: ICD-10-CM

## 2020-08-26 DIAGNOSIS — E66.9 CLASS 2 OBESITY IN ADULT, UNSPECIFIED BMI, UNSPECIFIED OBESITY TYPE, UNSPECIFIED WHETHER SERIOUS COMORBIDITY PRESENT: ICD-10-CM

## 2020-08-26 DIAGNOSIS — Z00.00 ANNUAL PHYSICAL EXAM: Primary | ICD-10-CM

## 2020-08-26 DIAGNOSIS — E55.9 VITAMIN D DEFICIENCY: ICD-10-CM

## 2020-08-26 DIAGNOSIS — M67.919 TENDINOPATHY OF ROTATOR CUFF, UNSPECIFIED LATERALITY: ICD-10-CM

## 2020-08-26 DIAGNOSIS — G89.29 CHRONIC BILATERAL LOW BACK PAIN WITHOUT SCIATICA: ICD-10-CM

## 2020-08-26 DIAGNOSIS — Z79.82 LONG TERM CURRENT USE OF ASPIRIN: ICD-10-CM

## 2020-08-26 DIAGNOSIS — M54.50 CHRONIC BILATERAL LOW BACK PAIN WITHOUT SCIATICA: ICD-10-CM

## 2020-08-26 PROCEDURE — 3079F DIAST BP 80-89 MM HG: CPT | Performed by: INTERNAL MEDICINE

## 2020-08-26 PROCEDURE — 3074F SYST BP LT 130 MM HG: CPT | Performed by: INTERNAL MEDICINE

## 2020-08-26 PROCEDURE — 99396 PREV VISIT EST AGE 40-64: CPT | Performed by: INTERNAL MEDICINE

## 2020-08-26 PROCEDURE — 3008F BODY MASS INDEX DOCD: CPT | Performed by: INTERNAL MEDICINE

## 2020-08-26 PROCEDURE — 36415 COLL VENOUS BLD VENIPUNCTURE: CPT | Performed by: INTERNAL MEDICINE

## 2020-08-26 RX ORDER — ASPIRIN 81 MG/1
81 TABLET ORAL DAILY
Qty: 90 TABLET | Refills: 1 | Status: SHIPPED | OUTPATIENT
Start: 2020-08-26

## 2020-08-26 RX ORDER — CYCLOBENZAPRINE HCL 10 MG
10 TABLET ORAL 3 TIMES DAILY PRN
Qty: 15 TABLET | Refills: 0 | Status: SHIPPED | OUTPATIENT
Start: 2020-08-26 | End: 2021-09-25

## 2020-08-26 RX ORDER — ESCITALOPRAM OXALATE 20 MG/1
20 TABLET ORAL DAILY
Qty: 90 TABLET | Refills: 1 | Status: SHIPPED | OUTPATIENT
Start: 2020-08-26 | End: 2021-09-25

## 2020-08-26 NOTE — PROGRESS NOTES
[facility]  Return Patient Progress Note      HPI:   Patient presents with:  Physical: Fasting  needs refills on all meds      Jose Huerta is a 51year old male presenting for:  annual    Has a significant  has a past medical histo LDL-CHOLESTEROL 96 mg/dL (calc)    CHOL/HDLC RATIO 3.3 <5.0 (calc)    NON-HDL CHOLESTEROL 113 <130 mg/dL (calc)   VITAMIN D, 25-HYDROXY   Result Value Ref Range    VITAMIN D, 25-OH, TOTAL 14 (L) 30 - 100 ng/mL   URINALYSIS, ROUTINE   Result Value Ref Range mg total) by mouth 3 (three) times daily as needed for Muscle spasms. 15 tablet 0   • Ergocalciferol 2500 units Oral Cap Take 2 capsules by mouth daily.  180 capsule 0   • raNITIdine HCl ([facility] RANITIDINE) 75 MG Oral Tab Take 1 tablet (75 mg total) by mouth d joint swelling, gait problem and neck pain. Skin: Negative for rash and wound. Allergic/Immunologic: Negative for food allergies and immunocompromised state.    Neurological: Negative for dizziness, seizures, facial asymmetry, speech difficulty, weaknes Prostate and rectum normal.   Musculoskeletal: Normal range of motion. He exhibits no tenderness. Comments: Minimal decrease range of motion when raising arms superiorly. Lymphadenopathy:     He has no cervical adenopathy.    Neurological: He is al escitalopram 20 MG Oral Tab 90 tablet 1     Sig: Take 1 tablet (20 mg total) by mouth daily. • aspirin (ASPIRIN EC LOW DOSE) 81 MG Oral Tab EC 90 tablet 1     Sig: Take 1 tablet (81 mg total) by mouth daily.    • Diclofenac Sodium 1 % Transdermal Gel 1 Tu

## 2020-08-26 NOTE — PROGRESS NOTES
Added Vitamin D and HCV as part of blood work orders as authorized by MD.    Patient is fasting. Blood drawn from right ac; tolerated well without complications.   Sent to [place].

## 2020-08-27 ENCOUNTER — TELEPHONE (OUTPATIENT)
Dept: FAMILY MEDICINE CLINIC | Facility: CLINIC | Age: 50
End: 2020-08-27

## 2020-08-27 LAB
ABSOLUTE BASOPHILS: 72 CELLS/UL (ref 0–200)
ABSOLUTE EOSINOPHILS: 245 CELLS/UL (ref 15–500)
ABSOLUTE LYMPHOCYTES: 3276 CELLS/UL (ref 850–3900)
ABSOLUTE MONOCYTES: 533 CELLS/UL (ref 200–950)
ABSOLUTE NEUTROPHILS: 3074 CELLS/UL (ref 1500–7800)
ALBUMIN/GLOBULIN RATIO: 1.6 (CALC) (ref 1–2.5)
ALBUMIN: 4.4 G/DL (ref 3.6–5.1)
ALKALINE PHOSPHATASE: 58 U/L (ref 36–130)
ALT: 86 U/L (ref 9–46)
AST: 56 U/L (ref 10–40)
BASOPHILS: 1 %
BILIRUBIN, TOTAL: 0.6 MG/DL (ref 0.2–1.2)
BUN: 18 MG/DL (ref 7–25)
CALCIUM: 9.3 MG/DL (ref 8.6–10.3)
CARBON DIOXIDE: 21 MMOL/L (ref 20–32)
CHLORIDE: 103 MMOL/L (ref 98–110)
CHOL/HDLC RATIO: 3.3 (CALC)
CHOLESTEROL, TOTAL: 163 MG/DL
CREATININE: 0.83 MG/DL (ref 0.6–1.35)
EGFR IF AFRICN AM: 121 ML/MIN/1.73M2
EGFR IF NONAFRICN AM: 104 ML/MIN/1.73M2
EOSINOPHILS: 3.4 %
GLOBULIN: 2.7 G/DL (CALC) (ref 1.9–3.7)
GLUCOSE: 101 MG/DL (ref 65–99)
HDL CHOLESTEROL: 50 MG/DL
HEMATOCRIT: 42.4 % (ref 38.5–50)
HEMOGLOBIN A1C: 6.5 % OF TOTAL HGB
HEMOGLOBIN: 14.5 G/DL (ref 13.2–17.1)
LDL-CHOLESTEROL: 94 MG/DL (CALC)
LYMPHOCYTES: 45.5 %
MCH: 29.3 PG (ref 27–33)
MCHC: 34.2 G/DL (ref 32–36)
MCV: 85.7 FL (ref 80–100)
MONOCYTES: 7.4 %
MPV: 10.1 FL (ref 7.5–12.5)
NEUTROPHILS: 42.7 %
NON-HDL CHOLESTEROL: 113 MG/DL (CALC)
PLATELET COUNT: 290 THOUSAND/UL (ref 140–400)
POTASSIUM: 4.6 MMOL/L (ref 3.5–5.3)
PROTEIN, TOTAL: 7.1 G/DL (ref 6.1–8.1)
RDW: 13.5 % (ref 11–15)
RED BLOOD CELL COUNT: 4.95 MILLION/UL (ref 4.2–5.8)
SIGNAL TO CUT-OFF: 0.03
SODIUM: 137 MMOL/L (ref 135–146)
TRIGLYCERIDES: 99 MG/DL
VITAMIN D, 25-OH, TOTAL: 17 NG/ML (ref 30–100)
WHITE BLOOD CELL COUNT: 7.2 THOUSAND/UL (ref 3.8–10.8)

## 2020-08-27 NOTE — TELEPHONE ENCOUNTER
----- Message from Cyndi Ríos MD sent at 8/27/2020  3:04 PM CDT -----  Please inform patient that labs were reviewed. A1C is 6.5 or above. Considered new diagnosis of diabetes.   Please have him set up virtual visit to discuss dietary recommendatio

## 2021-03-04 DIAGNOSIS — F41.9 ANXIETY: ICD-10-CM

## 2021-03-04 RX ORDER — ESCITALOPRAM OXALATE 20 MG/1
TABLET ORAL
Qty: 90 TABLET | Refills: 1 | OUTPATIENT
Start: 2021-03-04

## 2021-03-22 DIAGNOSIS — F41.9 ANXIETY: ICD-10-CM

## 2021-03-22 RX ORDER — ESCITALOPRAM OXALATE 20 MG/1
20 TABLET ORAL DAILY
Qty: 90 TABLET | Refills: 1 | OUTPATIENT
Start: 2021-03-22

## 2021-04-06 ENCOUNTER — TELEMEDICINE (OUTPATIENT)
Dept: TELEHEALTH | Age: 51
End: 2021-04-06

## 2021-04-06 DIAGNOSIS — Z02.9 ENCOUNTER FOR ADMINISTRATIVE EXAMINATIONS, UNSPECIFIED: Primary | ICD-10-CM

## 2021-04-07 NOTE — PROGRESS NOTES
Pt went to [facility] care and was treated for a UTI. No further needs at time. No charge.

## 2021-08-13 ENCOUNTER — HOSPITAL ENCOUNTER (EMERGENCY)
Facility: HOSPITAL | Age: 51
Discharge: HOME | End: 2021-08-13
Attending: EMERGENCY MEDICINE
Payer: COMMERCIAL

## 2021-08-13 ENCOUNTER — APPOINTMENT (OUTPATIENT)
Dept: CT IMAGING | Facility: HOSPITAL | Age: 51
End: 2021-08-13
Attending: EMERGENCY MEDICINE
Payer: COMMERCIAL

## 2021-08-13 ENCOUNTER — APPOINTMENT (OUTPATIENT)
Dept: ULTRASOUND IMAGING | Facility: HOSPITAL | Age: 51
End: 2021-08-13
Attending: EMERGENCY MEDICINE
Payer: COMMERCIAL

## 2021-08-13 VITALS
HEIGHT: 69 IN | BODY MASS INDEX: 20.73 KG/M2 | RESPIRATION RATE: 20 BRPM | OXYGEN SATURATION: 97 % | HEART RATE: 71 BPM | WEIGHT: 140 LBS | TEMPERATURE: 98 F | SYSTOLIC BLOOD PRESSURE: 117 MMHG | DIASTOLIC BLOOD PRESSURE: 72 MMHG

## 2021-08-13 DIAGNOSIS — R74.8 ELEVATED LIVER ENZYMES: ICD-10-CM

## 2021-08-13 DIAGNOSIS — K76.0 FATTY LIVER: Primary | ICD-10-CM

## 2021-08-13 LAB
ALBUMIN SERPL-MCNC: 3.9 G/DL (ref 3.4–5)
ALP LIVER SERPL-CCNC: 79 U/L
ALT SERPL-CCNC: 119 U/L
ANION GAP SERPL CALC-SCNC: 5 MMOL/L (ref 0–18)
AST SERPL-CCNC: 48 U/L (ref 15–37)
BASOPHILS # BLD AUTO: 0.06 X10(3) UL (ref 0–0.2)
BASOPHILS NFR BLD AUTO: 0.9 %
BILIRUB DIRECT SERPL-MCNC: 0.1 MG/DL (ref 0–0.2)
BILIRUB SERPL-MCNC: 0.3 MG/DL (ref 0.1–2)
BILIRUB UR QL: NEGATIVE
BUN BLD-MCNC: 13 MG/DL (ref 7–18)
BUN/CREAT SERPL: 16.7 (ref 10–20)
CALCIUM BLD-MCNC: 9.2 MG/DL (ref 8.5–10.1)
CHLORIDE SERPL-SCNC: 106 MMOL/L (ref 98–112)
CLARITY UR: CLEAR
CO2 SERPL-SCNC: 27 MMOL/L (ref 21–32)
COLOR UR: YELLOW
CREAT BLD-MCNC: 0.78 MG/DL
DEPRECATED RDW RBC AUTO: 44.3 FL (ref 35.1–46.3)
EOSINOPHIL # BLD AUTO: 0.15 X10(3) UL (ref 0–0.7)
EOSINOPHIL NFR BLD AUTO: 2.2 %
ERYTHROCYTE [DISTWIDTH] IN BLOOD BY AUTOMATED COUNT: 13.7 % (ref 11–15)
GLUCOSE BLD-MCNC: 121 MG/DL (ref 70–99)
GLUCOSE UR-MCNC: NEGATIVE MG/DL
HCT VFR BLD AUTO: 42.6 %
HGB BLD-MCNC: 13.9 G/DL
HGB UR QL STRIP.AUTO: NEGATIVE
IMM GRANULOCYTES # BLD AUTO: 0.02 X10(3) UL (ref 0–1)
IMM GRANULOCYTES NFR BLD: 0.3 %
KETONES UR-MCNC: NEGATIVE MG/DL
LEUKOCYTE ESTERASE UR QL STRIP.AUTO: NEGATIVE
LIPASE SERPL-CCNC: 136 U/L (ref 73–393)
LYMPHOCYTES # BLD AUTO: 2.92 X10(3) UL (ref 1–4)
LYMPHOCYTES NFR BLD AUTO: 42.1 %
M PROTEIN MFR SERPL ELPH: 8 G/DL (ref 6.4–8.2)
MCH RBC QN AUTO: 28.4 PG (ref 26–34)
MCHC RBC AUTO-ENTMCNC: 32.6 G/DL (ref 31–37)
MCV RBC AUTO: 87.1 FL
MONOCYTES # BLD AUTO: 0.44 X10(3) UL (ref 0.1–1)
MONOCYTES NFR BLD AUTO: 6.3 %
NEUTROPHILS # BLD AUTO: 3.34 X10 (3) UL (ref 1.5–7.7)
NEUTROPHILS # BLD AUTO: 3.34 X10(3) UL (ref 1.5–7.7)
NEUTROPHILS NFR BLD AUTO: 48.2 %
NITRITE UR QL STRIP.AUTO: NEGATIVE
OSMOLALITY SERPL CALC.SUM OF ELEC: 287 MOSM/KG (ref 275–295)
PH UR: 6 [PH] (ref 5–8)
PLATELET # BLD AUTO: 284 10(3)UL (ref 150–450)
POTASSIUM SERPL-SCNC: 3.7 MMOL/L (ref 3.5–5.1)
PROT UR-MCNC: NEGATIVE MG/DL
RBC # BLD AUTO: 4.89 X10(6)UL
SODIUM SERPL-SCNC: 138 MMOL/L (ref 136–145)
SP GR UR STRIP: 1.01 (ref 1–1.03)
UROBILINOGEN UR STRIP-ACNC: <2
WBC # BLD AUTO: 6.9 X10(3) UL (ref 4–11)

## 2021-08-13 PROCEDURE — S0028 INJECTION, FAMOTIDINE, 20 MG: HCPCS | Performed by: EMERGENCY MEDICINE

## 2021-08-13 PROCEDURE — 96374 THER/PROPH/DIAG INJ IV PUSH: CPT

## 2021-08-13 PROCEDURE — 81003 URINALYSIS AUTO W/O SCOPE: CPT

## 2021-08-13 PROCEDURE — 93010 ELECTROCARDIOGRAM REPORT: CPT | Performed by: EMERGENCY MEDICINE

## 2021-08-13 PROCEDURE — 80048 BASIC METABOLIC PNL TOTAL CA: CPT | Performed by: EMERGENCY MEDICINE

## 2021-08-13 PROCEDURE — 74177 CT ABD & PELVIS W/CONTRAST: CPT | Performed by: EMERGENCY MEDICINE

## 2021-08-13 PROCEDURE — 85025 COMPLETE CBC W/AUTO DIFF WBC: CPT | Performed by: EMERGENCY MEDICINE

## 2021-08-13 PROCEDURE — 81003 URINALYSIS AUTO W/O SCOPE: CPT | Performed by: EMERGENCY MEDICINE

## 2021-08-13 PROCEDURE — 76705 ECHO EXAM OF ABDOMEN: CPT | Performed by: EMERGENCY MEDICINE

## 2021-08-13 PROCEDURE — 80076 HEPATIC FUNCTION PANEL: CPT | Performed by: EMERGENCY MEDICINE

## 2021-08-13 PROCEDURE — 83690 ASSAY OF LIPASE: CPT | Performed by: EMERGENCY MEDICINE

## 2021-08-13 PROCEDURE — 99285 EMERGENCY DEPT VISIT HI MDM: CPT

## 2021-08-13 PROCEDURE — 93005 ELECTROCARDIOGRAM TRACING: CPT

## 2021-08-13 RX ORDER — FAMOTIDINE 10 MG/ML
20 INJECTION, SOLUTION INTRAVENOUS ONCE
Status: COMPLETED | OUTPATIENT
Start: 2021-08-13 | End: 2021-08-13

## 2021-08-13 NOTE — ED INITIAL ASSESSMENT (HPI)
Pt reports ruq pain with bloating x 1 week. Pt states that the pain is constant.  Last bm was this am

## 2021-08-14 NOTE — ED PROVIDER NOTES
Patient Seen in: [facility] Emergency Department      History   Patient presents with:  Abdomen/Flank Pain    Stated Complaint:     HPI/Subjective:   HPI  36-year-old male presents for evaluation of abdominal pain.   Over the past 1 week he has had well-developed. HENT:      Head: Normocephalic and atraumatic. Cardiovascular:      Rate and Rhythm: Normal rate and regular rhythm. Heart sounds: Normal heart sounds.    Pulmonary:      Effort: Pulmonary effort is normal.      Breath sounds: [place] RAINBOW DRAW LIGHT GREEN   RAINBOW DRAW GOLD   CBC W/ DIFFERENTIAL     EKG    Rate, intervals and axes as noted on EKG Report.   Rate: 74  Rhythm: Sinus Rhythm  Reading: No STEMI, normal intervals, normal axis              US GALLBLADDER (CPT=76705)    Re [id number]  [phone]                Medications Prescribed:  Discharge Medication List as of 8/13/2021 10:26 PM

## 2021-09-23 ENCOUNTER — OFFICE VISIT (OUTPATIENT)
Dept: FAMILY MEDICINE CLINIC | Facility: CLINIC | Age: 51
End: 2021-09-23
Payer: COMMERCIAL

## 2021-09-23 VITALS
TEMPERATURE: 98 F | HEIGHT: 67.5 IN | OXYGEN SATURATION: 99 % | DIASTOLIC BLOOD PRESSURE: 64 MMHG | SYSTOLIC BLOOD PRESSURE: 110 MMHG | WEIGHT: 240 LBS | BODY MASS INDEX: 37.23 KG/M2 | HEART RATE: 78 BPM

## 2021-09-23 DIAGNOSIS — M54.16 LUMBAR RADICULOPATHY: ICD-10-CM

## 2021-09-23 DIAGNOSIS — E11.8 CONTROLLED TYPE 2 DIABETES MELLITUS WITH COMPLICATION, WITHOUT LONG-TERM CURRENT USE OF INSULIN (HCC): ICD-10-CM

## 2021-09-23 DIAGNOSIS — K76.0 FATTY LIVER: ICD-10-CM

## 2021-09-23 DIAGNOSIS — Z00.00 ANNUAL PHYSICAL EXAM: Primary | ICD-10-CM

## 2021-09-23 DIAGNOSIS — M25.569 KNEE PAIN, UNSPECIFIED CHRONICITY, UNSPECIFIED LATERALITY: ICD-10-CM

## 2021-09-23 PROCEDURE — 99214 OFFICE O/P EST MOD 30 MIN: CPT | Performed by: INTERNAL MEDICINE

## 2021-09-23 PROCEDURE — 3078F DIAST BP <80 MM HG: CPT | Performed by: INTERNAL MEDICINE

## 2021-09-23 PROCEDURE — 3074F SYST BP LT 130 MM HG: CPT | Performed by: INTERNAL MEDICINE

## 2021-09-23 PROCEDURE — 3044F HG A1C LEVEL LT 7.0%: CPT | Performed by: INTERNAL MEDICINE

## 2021-09-23 PROCEDURE — 36415 COLL VENOUS BLD VENIPUNCTURE: CPT | Performed by: INTERNAL MEDICINE

## 2021-09-23 PROCEDURE — 99396 PREV VISIT EST AGE 40-64: CPT | Performed by: INTERNAL MEDICINE

## 2021-09-23 PROCEDURE — 3008F BODY MASS INDEX DOCD: CPT | Performed by: INTERNAL MEDICINE

## 2021-09-23 RX ORDER — MECLIZINE HYDROCHLORIDE 25 MG/1
25 TABLET ORAL 3 TIMES DAILY PRN
Qty: 30 TABLET | Refills: 0 | Status: SHIPPED | OUTPATIENT
Start: 2021-09-23

## 2021-09-24 ENCOUNTER — TELEPHONE (OUTPATIENT)
Dept: FAMILY MEDICINE CLINIC | Facility: CLINIC | Age: 51
End: 2021-09-24

## 2021-09-24 DIAGNOSIS — G89.29 CHRONIC BILATERAL LOW BACK PAIN WITHOUT SCIATICA: ICD-10-CM

## 2021-09-24 DIAGNOSIS — F41.9 ANXIETY: ICD-10-CM

## 2021-09-24 DIAGNOSIS — M54.50 CHRONIC BILATERAL LOW BACK PAIN WITHOUT SCIATICA: ICD-10-CM

## 2021-09-24 LAB
ALBUMIN/GLOBULIN RATIO: 1.5 (CALC) (ref 1–2.5)
ALBUMIN: 4.4 G/DL (ref 3.6–5.1)
ALKALINE PHOSPHATASE: 59 U/L (ref 36–130)
ALT: 56 U/L (ref 9–46)
AST: 32 U/L (ref 10–40)
BILIRUBIN, TOTAL: 0.5 MG/DL (ref 0.2–1.2)
BUN: 16 MG/DL (ref 7–25)
CALCIUM: 9.8 MG/DL (ref 8.6–10.3)
CARBON DIOXIDE: 26 MMOL/L (ref 20–32)
CHLORIDE: 104 MMOL/L (ref 98–110)
CHOL/HDLC RATIO: 2.9 (CALC)
CHOLESTEROL, TOTAL: 158 MG/DL
CREATININE: 0.81 MG/DL (ref 0.6–1.35)
EGFR IF AFRICN AM: 121 ML/MIN/1.73M2
EGFR IF NONAFRICN AM: 104 ML/MIN/1.73M2
GLOBULIN: 2.9 G/DL (CALC) (ref 1.9–3.7)
GLUCOSE: 105 MG/DL (ref 65–99)
HDL CHOLESTEROL: 55 MG/DL
HEMATOCRIT: 41.6 % (ref 38.5–50)
HEMOGLOBIN A1C: 6.3 % OF TOTAL HGB
HEMOGLOBIN: 13.9 G/DL (ref 13.2–17.1)
LDL-CHOLESTEROL: 87 MG/DL (CALC)
MCH: 28.8 PG (ref 27–33)
MCHC: 33.4 G/DL (ref 32–36)
MCV: 86.1 FL (ref 80–100)
MPV: 9.9 FL (ref 7.5–12.5)
NON-HDL CHOLESTEROL: 103 MG/DL (CALC)
PLATELET COUNT: 278 THOUSAND/UL (ref 140–400)
POTASSIUM: 4.7 MMOL/L (ref 3.5–5.3)
PROTEIN, TOTAL: 7.3 G/DL (ref 6.1–8.1)
RDW: 13.3 % (ref 11–15)
RED BLOOD CELL COUNT: 4.83 MILLION/UL (ref 4.2–5.8)
SIGNAL TO CUT-OFF: 0.04
SODIUM: 138 MMOL/L (ref 135–146)
TRIGLYCERIDES: 71 MG/DL
WHITE BLOOD CELL COUNT: 6.4 THOUSAND/UL (ref 3.8–10.8)

## 2021-09-24 NOTE — TELEPHONE ENCOUNTER
Pt called statin that doctor forgot to send him refills for escitalopram 20 MG Oral Tab  cyclobenzaprine 10 MG Oral Tab

## 2021-09-25 RX ORDER — CYCLOBENZAPRINE HCL 10 MG
10 TABLET ORAL 3 TIMES DAILY PRN
Qty: 90 TABLET | Refills: 0 | Status: SHIPPED | OUTPATIENT
Start: 2021-09-25

## 2021-09-25 RX ORDER — ESCITALOPRAM OXALATE 20 MG/1
20 TABLET ORAL DAILY
Qty: 90 TABLET | Refills: 1 | Status: SHIPPED | OUTPATIENT
Start: 2021-09-25

## 2021-09-27 NOTE — PROGRESS NOTES
Please inform him that labs were reviewed. CMP stable with improving liver enzymes. His A1C controlle.d  Lipid panel normal.      Given hx of fatty liver check hepatitis panel. He is not immune to Hep A or B.  I recommend vaccination (orders placed).   H

## 2021-09-27 NOTE — PROGRESS NOTES
[facility]  Return Patient Progress Note      HPI:   Patient presents with:  Physical: yearly exam   Medication Request      Dione Leos is a 53year old male presenting for:  annual    Has a significant  has a past medical history 17.1 g/dL    HEMATOCRIT 41.6 38.5 - 50.0 %    MCV 86.1 80.0 - 100.0 fL    MCH 28.8 27.0 - 33.0 pg    MCHC 33.4 32.0 - 36.0 g/dL    RDW 13.3 11.0 - 15.0 %    PLATELET COUNT 761 431 - 400 Thousand/uL    MPV 9.9 7.5 - 12.5 fL   LIPID PANEL   Result Value Ref as needed for Muscle spasms. 90 tablet 0   • Dicyclomine HCl 10 MG Oral Cap Take 1 capsule (10 mg total) by mouth 4 (four) times daily as needed (bloating).  (Patient not taking: Reported on 9/23/2021) 90 capsule 0      PMH:  Past Medical History:   Diagnos dizziness, seizures, facial asymmetry, speech difficulty, weakness, light-headedness, numbness and headaches. Hematological: Negative for adenopathy. Does not bruise/bleed easily.    Psychiatric/Behavioral: Negative for suicidal ideas, behavioral problems alert and oriented to person, place, and time. He has normal reflexes. No cranial nerve deficit. Skin: Skin is warm. No rash noted. No erythema. Psychiatric: He has a normal mood and affect.  His behavior is normal. Judgment and thought content normal. INTERNAL          No follow-ups on file. Important follow up notes/labs for next visit follow up on labs. Patient indicates understanding of the above recommendations and agrees to the above plan. Reasurrance and education provided.  All questions

## 2021-09-30 ENCOUNTER — TELEPHONE (OUTPATIENT)
Dept: FAMILY MEDICINE CLINIC | Facility: CLINIC | Age: 51
End: 2021-09-30

## 2021-09-30 NOTE — TELEPHONE ENCOUNTER
LMTCB       ----- Message from Frankie Wasserman MD sent at 9/27/2021  5:28 PM CDT -----  Please inform him that labs were reviewed. CMP stable with improving liver enzymes.   His A1C controlle.d  Lipid panel normal.      Given hx of fatty liver check hepa

## 2021-09-30 NOTE — TELEPHONE ENCOUNTER
Patient called back and results were discussed, he is willing to get vaccinated and is coming to get them tomorrow, order were entered.

## 2021-10-01 ENCOUNTER — NURSE ONLY (OUTPATIENT)
Dept: FAMILY MEDICINE CLINIC | Facility: CLINIC | Age: 51
End: 2021-10-01
Payer: COMMERCIAL

## 2021-10-01 PROCEDURE — 90471 IMMUNIZATION ADMIN: CPT | Performed by: INTERNAL MEDICINE

## 2021-10-01 PROCEDURE — 90472 IMMUNIZATION ADMIN EACH ADD: CPT | Performed by: INTERNAL MEDICINE

## 2021-10-01 PROCEDURE — 90746 HEPB VACCINE 3 DOSE ADULT IM: CPT | Performed by: INTERNAL MEDICINE

## 2021-10-01 PROCEDURE — 90632 HEPA VACCINE ADULT IM: CPT | Performed by: INTERNAL MEDICINE

## 2021-10-01 NOTE — PROGRESS NOTES
HEP A 1ml administered to RD IM, HEP B 1ml administered to LD IM, VIS given to patient, patient tolerated vaccines well

## 2021-12-01 ENCOUNTER — NURSE ONLY (OUTPATIENT)
Dept: FAMILY MEDICINE CLINIC | Facility: CLINIC | Age: 51
End: 2021-12-01
Payer: COMMERCIAL

## 2021-12-01 DIAGNOSIS — Z23 NEED FOR HEPATITIS B VACCINATION: Primary | ICD-10-CM

## 2021-12-01 PROCEDURE — 90746 HEPB VACCINE 3 DOSE ADULT IM: CPT | Performed by: INTERNAL MEDICINE

## 2021-12-01 PROCEDURE — 90471 IMMUNIZATION ADMIN: CPT | Performed by: INTERNAL MEDICINE

## 2021-12-01 NOTE — PROGRESS NOTES
Patient presented to clinic for HBV #2 vaccination. Name and  verified. Administered vaccine on the left arm, tolerated well without complications. Advised to RTC in 4 months for the final dose of the HBV series.   Advised to RTC in 4 months for the

## 2021-12-29 ENCOUNTER — TELEPHONE (OUTPATIENT)
Dept: GASTROENTEROLOGY | Facility: CLINIC | Age: 51
End: 2021-12-29

## 2021-12-29 NOTE — TELEPHONE ENCOUNTER
Contacted patient and confirmed to change appointment for tomorrow 12/30 at 5:30pm to a video visit instead of clinic visit.      Your appointments     Date & Time Appointment Department [facility])    Dec 30, 2021  5:30 PM CST Video Visit with Bereket Chester

## 2022-01-02 ENCOUNTER — APPOINTMENT (OUTPATIENT)
Dept: CT IMAGING | Facility: HOSPITAL | Age: 52
End: 2022-01-02
Attending: EMERGENCY MEDICINE
Payer: COMMERCIAL

## 2022-01-02 ENCOUNTER — HOSPITAL ENCOUNTER (EMERGENCY)
Facility: HOSPITAL | Age: 52
Discharge: HOME | End: 2022-01-02
Attending: EMERGENCY MEDICINE
Payer: COMMERCIAL

## 2022-01-02 VITALS
HEART RATE: 98 BPM | RESPIRATION RATE: 20 BRPM | TEMPERATURE: 98 F | DIASTOLIC BLOOD PRESSURE: 70 MMHG | OXYGEN SATURATION: 96 % | SYSTOLIC BLOOD PRESSURE: 135 MMHG | HEIGHT: 68 IN | BODY MASS INDEX: 36.37 KG/M2 | WEIGHT: 240 LBS

## 2022-01-02 DIAGNOSIS — H81.10 BENIGN PAROXYSMAL POSITIONAL VERTIGO, UNSPECIFIED LATERALITY: Primary | ICD-10-CM

## 2022-01-02 PROCEDURE — 93005 ELECTROCARDIOGRAM TRACING: CPT

## 2022-01-02 PROCEDURE — 99284 EMERGENCY DEPT VISIT MOD MDM: CPT

## 2022-01-02 PROCEDURE — 70450 CT HEAD/BRAIN W/O DYE: CPT | Performed by: EMERGENCY MEDICINE

## 2022-01-02 PROCEDURE — 93010 ELECTROCARDIOGRAM REPORT: CPT | Performed by: EMERGENCY MEDICINE

## 2022-01-02 RX ORDER — ONDANSETRON 4 MG/1
4 TABLET, ORALLY DISINTEGRATING ORAL EVERY 4 HOURS PRN
Qty: 14 TABLET | Refills: 0 | Status: SHIPPED | OUTPATIENT
Start: 2022-01-02 | End: 2022-01-09

## 2022-01-02 RX ORDER — MECLIZINE HYDROCHLORIDE 25 MG/1
25 TABLET ORAL ONCE
Status: COMPLETED | OUTPATIENT
Start: 2022-01-02 | End: 2022-01-02

## 2022-01-02 RX ORDER — MECLIZINE HYDROCHLORIDE 25 MG/1
25 TABLET ORAL 3 TIMES DAILY PRN
Qty: 14 TABLET | Refills: 0 | Status: SHIPPED | OUTPATIENT
Start: 2022-01-02 | End: 2022-01-19

## 2022-01-02 RX ORDER — ONDANSETRON 4 MG/1
4 TABLET, ORALLY DISINTEGRATING ORAL EVERY 4 HOURS PRN
Qty: 10 TABLET | Refills: 0 | Status: SHIPPED | OUTPATIENT
Start: 2022-01-02 | End: 2022-01-09

## 2022-01-02 RX ORDER — ONDANSETRON 4 MG/1
4 TABLET, ORALLY DISINTEGRATING ORAL ONCE
Status: COMPLETED | OUTPATIENT
Start: 2022-01-02 | End: 2022-01-02

## 2022-01-02 NOTE — ED PROVIDER NOTES
Patient Seen in: [facility] AND [facility] Emergency Department    History   Patient presents with:  Dizziness      HPI    16-year-old male presents the ER with complaint of dizziness and nausea. Patient states he has a past medical history of vertigo.   Patient s SpO2 95 %   O2 Device None (Room air)       All measures to prevent infection transmission during my interaction with the patient were taken. The patient was already wearing a droplet mask on my arrival to the room.  Personal protective equipment includin Imaging Results Available and Reviewed while in ED: CT BRAIN OR HEAD ([id number])    Result Date: 1/2/2022  CONCLUSION:   Normal noncontrast head CT.     Dictated by (CST): Patrizia Mcneil MD on 1/02/2022 at 7:01 PM     Finalized by (CST): Patrizia Mcneil MD o taking these medications    !! meclizine 25 MG Oral Tab  Take 1 tablet (25 mg total) by mouth 3 (three) times daily as needed.   Qty: 14 tablet Refills: 0    ondansetron 4 MG Oral Tablet Dispersible  Take 1 tablet (4 mg total) by mouth every 4 (four) hours

## 2022-01-02 NOTE — ED INITIAL ASSESSMENT (HPI)
Pt c/o intermittent dizziness for the past week, worsened this morning at 0700a while laying down. Pt denies any pain at this time.

## 2022-01-04 ENCOUNTER — TELEPHONE (OUTPATIENT)
Dept: FAMILY MEDICINE CLINIC | Facility: CLINIC | Age: 52
End: 2022-01-04

## 2022-01-04 NOTE — TELEPHONE ENCOUNTER
Pt called requesting a referral for a ear doctor because of his vertigo? ?  Pt also requested referral for neurologist   Please call back and advise

## 2022-01-05 NOTE — TELEPHONE ENCOUNTER
Detailed VM left. Discussed with Dr. Anaya Bang. MD would like to see him in office as an ER f/up first prior to sending him out to specialist. If needs refills on meclizine prior to appt, we can refill.   But physician would like to see him first, and th

## 2022-01-19 ENCOUNTER — OFFICE VISIT (OUTPATIENT)
Dept: FAMILY MEDICINE CLINIC | Facility: CLINIC | Age: 52
End: 2022-01-19
Payer: COMMERCIAL

## 2022-01-19 VITALS
HEART RATE: 78 BPM | SYSTOLIC BLOOD PRESSURE: 120 MMHG | HEIGHT: 68 IN | DIASTOLIC BLOOD PRESSURE: 70 MMHG | BODY MASS INDEX: 37.28 KG/M2 | WEIGHT: 246 LBS | OXYGEN SATURATION: 95 %

## 2022-01-19 DIAGNOSIS — Z12.11 COLON CANCER SCREENING: ICD-10-CM

## 2022-01-19 DIAGNOSIS — E11.9 DIABETIC EYE EXAM (HCC): ICD-10-CM

## 2022-01-19 DIAGNOSIS — Z01.00 DIABETIC EYE EXAM (HCC): ICD-10-CM

## 2022-01-19 DIAGNOSIS — R42 VERTIGO: Primary | ICD-10-CM

## 2022-01-19 DIAGNOSIS — R73.03 PREDIABETES: ICD-10-CM

## 2022-01-19 LAB
CARTRIDGE LOT#: 874 NUMERIC
HEMOGLOBIN A1C: 6.6 % (ref 4.3–5.6)

## 2022-01-19 PROCEDURE — 3078F DIAST BP <80 MM HG: CPT | Performed by: INTERNAL MEDICINE

## 2022-01-19 PROCEDURE — 3074F SYST BP LT 130 MM HG: CPT | Performed by: INTERNAL MEDICINE

## 2022-01-19 PROCEDURE — 3044F HG A1C LEVEL LT 7.0%: CPT | Performed by: INTERNAL MEDICINE

## 2022-01-19 PROCEDURE — 83036 HEMOGLOBIN GLYCOSYLATED A1C: CPT | Performed by: INTERNAL MEDICINE

## 2022-01-19 PROCEDURE — 3008F BODY MASS INDEX DOCD: CPT | Performed by: INTERNAL MEDICINE

## 2022-01-19 PROCEDURE — 99214 OFFICE O/P EST MOD 30 MIN: CPT | Performed by: INTERNAL MEDICINE

## 2022-01-20 NOTE — PROGRESS NOTES
[facility]  Return Patient Progress Note      HPI:   Patient presents with:  ER F/U  Vertigo      Zoya Sanabria is a 49year old male presenting for:  ER FU    Has a significant  has a past medical history of Anxiety and Cutaneous sk tablet (81 mg total) by mouth daily. 90 tablet 1   • Ergocalciferol 2500 units Oral Cap Take 2 capsules by mouth daily. 180 capsule 0   • Dicyclomine HCl 10 MG Oral Cap Take 1 capsule (10 mg total) by mouth 4 (four) times daily as needed (bloating).  90 cap Neurological: Positive for dizziness. Negative for seizures, facial asymmetry, speech difficulty, weakness, light-headedness, numbness and headaches. Hematological: Negative for adenopathy. Does not bruise/bleed easily.    Psychiatric/Behavioral: Sean Marroquin cranial nerve deficit. Skin: Skin is warm. No rash noted. No erythema. Psychiatric: He has a normal mood and affect.  His behavior is normal. Judgment and thought content normal.           ASSESSMENT AND PLAN:   Patient is a 49year old male who present

## 2022-01-24 DIAGNOSIS — E11.9 DIABETIC EYE EXAM (HCC): Primary | ICD-10-CM

## 2022-01-24 DIAGNOSIS — Z01.00 DIABETIC EYE EXAM (HCC): Primary | ICD-10-CM

## 2022-01-27 ENCOUNTER — TELEPHONE (OUTPATIENT)
Dept: FAMILY MEDICINE CLINIC | Facility: CLINIC | Age: 52
End: 2022-01-27

## 2022-01-27 NOTE — TELEPHONE ENCOUNTER
----- Message from Vianca Sweeney MD sent at 1/24/2022  1:08 PM CST -----  Please inform that his A1C is not considered in the diabetic range. I recommend he start with metformin 500 BID. If he agrees please send 180 tabs.   Should follow up in 3 months

## 2022-01-28 NOTE — TELEPHONE ENCOUNTER
Pt returned call In regards test results    Pt also stated that doctor was suppose to send him Metformine to pharmacy and that was not done  To please send it today

## 2022-01-31 RX ORDER — METFORMIN HYDROCHLORIDE 500 MG/1
500 TABLET, EXTENDED RELEASE ORAL 2 TIMES DAILY WITH MEALS
Qty: 180 TABLET | Refills: 0 | Status: SHIPPED | OUTPATIENT
Start: 2022-01-31

## 2022-01-31 NOTE — TELEPHONE ENCOUNTER
Patient was notified of his results and is aware he is now diabetic and the importance of taking metformin and getting an eye exam.

## 2022-02-11 ENCOUNTER — TELEPHONE (OUTPATIENT)
Dept: PHYSICAL THERAPY | Facility: HOSPITAL | Age: 52
End: 2022-02-11

## 2022-02-14 ENCOUNTER — OFFICE VISIT (OUTPATIENT)
Dept: PHYSICAL THERAPY | Age: 52
End: 2022-02-14
Attending: INTERNAL MEDICINE
Payer: COMMERCIAL

## 2022-02-14 PROCEDURE — 97530 THERAPEUTIC ACTIVITIES: CPT | Performed by: PHYSICAL THERAPIST

## 2022-02-14 PROCEDURE — 97161 PT EVAL LOW COMPLEX 20 MIN: CPT | Performed by: PHYSICAL THERAPIST

## 2022-02-15 NOTE — PROGRESS NOTES
PHYSICAL THERAPY EVALUATION:   Referring Physician: Dr. Francisca Jorgensen  Diagnosis: Vertigo (R42)        Date of Onset: Feb 2022 Date of Service: 2/14/2022  Visit # 1  Scheduled Visits 8  Insurance Authorized visits 10 PPO     PATIENT SUMMARY   Christen Garces is a 49year old y/o male who presents to therapy today with reports of dizziness  History of current condition: Pt reports that he has had symptoms on/off for years. Pt reports that he has even had it as a kid. Pt reports no current symptoms. Pt reports that he gets dizzy/spinning sensation. Pt reports that if he vomits or takes a nap it goes away. Pt reports that he does get dizzy/spinning with getting into/out of bed. Pt reports no history of migraines, occasional headaches, but is not related to headaches. Pt reports that he saw his primary care MD and was referred to PT. Pt reports that he has not seen any other specialist. Pt reports that last year he had it 3 times a year. Pt reports ringing in the R ear and hearing loss. Pt reports sometimes symptoms last seconds/minutes. Symptoms with cough/sneeze or loud noise: no  Falls: no  Hx of migraines:  No  Hx of vision issue:  No  Hx of hearing issues:   Yes: R hearing loss    Dizziness:  None recent  Quality: when gets it spinning sensation  Aggravates: Supine to/from sit  Relieves: Lying down      Cervical pain:  Intermittent neck stiffness          Current functional limitations include none, no dizziness currently  Social history:  Pt works as . Past medical history was reviewed with Lam Whiteside. Significant findings include  has a past medical history of Anxiety and Cutaneous skin tags (8/24/2018). ASSESSMENT:      Lam Whiteside presents today with normal vestibular testing, negative testing for BPPV, good balance and no recent report of dizziness. Pt will not be seen for PT at this time.  Pt was told to follow up with MD and may benefit from further evaluation by a neurologist. Precautions:  None      OBJECTIVE:   Physical Exam:      Cervical spine ROM: [id number]%, Ext 100%, R %, L % , R %, L %       Coordination Testing:   Finger to Nose: WNL  Heel to Shin: WNL      Oculomotor/Vestibular Exam:  Spontaneous Nystagmus: In light neg In darkness neg  Smooth Pursuit: Negative   Saccades: Negative   Convergence: Negative    Cover/Uncover: Negative   Cross Cover: Negative   Head Thrust: Negative  VOR: Slow Negative, Rapid Negative  VOR Cancellation: Negative   Dynamic Visual Acuity: Negative (Positive if 3 or more)  Gaze Evoked Nystagmus: neg  Head Shaking Nystagmus: Negative       Positional Testing:(Performed with gogles on)  Mati-Hallpike: R neg, L neg  Roll Test [facility]): neg    Postural Control:   Romberg: EO> 20 sec, EC >20 sec   Romberg on Foam: EO >20 sec, EC >20 sec   Tandem Stance: R back EO >20 sec L back EO >20 sec   SLS: R EO >20 sec, L EO >20 sec    Functional Mobility:  Functional Mobility/Gait:normal      Today's Treatment and Response:   Pt education was provided on exam findings, treatment diagnosis, treatment plan, expectations, and prognosis. Pt was educated on what BPPV is and was given a handout to read up on. Charges: PT Eval x1(Low), 1 theract      Total Timed Treatment: 15 min     Total Treatment Time: 45 min            PLAN OF CARE:          Frequency / Duration: pt will be discharged from PT      Patient was advised of these findings, precautions, and treatment options and has agreed to actively participate in planning and for this course of care. Thank you for your referral.  If you have any questions, please contact me at Dept: [phone]    Sincerely,  Electronically signed by therapist: Jannette Ramon PT, DPT, cert MDT      [de-identified] certification required: Yes  I certify the need for these services furnished under this plan of treatment and while under my care.     X___________________________________________________ Date____________________    Certification From: 2/55/7875  To:5/15/2022

## 2022-02-21 ENCOUNTER — APPOINTMENT (OUTPATIENT)
Dept: PHYSICAL THERAPY | Age: 52
End: 2022-02-21
Attending: INTERNAL MEDICINE
Payer: COMMERCIAL

## 2022-02-24 ENCOUNTER — APPOINTMENT (OUTPATIENT)
Dept: PHYSICAL THERAPY | Age: 52
End: 2022-02-24
Attending: INTERNAL MEDICINE
Payer: COMMERCIAL

## 2022-02-28 ENCOUNTER — APPOINTMENT (OUTPATIENT)
Dept: PHYSICAL THERAPY | Age: 52
End: 2022-02-28
Attending: INTERNAL MEDICINE
Payer: COMMERCIAL

## 2022-03-10 ENCOUNTER — APPOINTMENT (OUTPATIENT)
Dept: PHYSICAL THERAPY | Age: 52
End: 2022-03-10
Attending: INTERNAL MEDICINE
Payer: COMMERCIAL

## 2022-03-17 ENCOUNTER — APPOINTMENT (OUTPATIENT)
Dept: PHYSICAL THERAPY | Age: 52
End: 2022-03-17
Attending: INTERNAL MEDICINE
Payer: COMMERCIAL

## 2022-03-29 DIAGNOSIS — F41.9 ANXIETY: ICD-10-CM

## 2022-03-29 RX ORDER — ESCITALOPRAM OXALATE 20 MG/1
20 TABLET ORAL DAILY
Qty: 90 TABLET | Refills: 0 | Status: SHIPPED | OUTPATIENT
Start: 2022-03-29 | End: 2022-07-12

## 2022-04-04 ENCOUNTER — NURSE ONLY (OUTPATIENT)
Dept: FAMILY MEDICINE CLINIC | Facility: CLINIC | Age: 52
End: 2022-04-04
Payer: COMMERCIAL

## 2022-04-04 DIAGNOSIS — Z23 NEED FOR HEPATITIS B VACCINATION: Primary | ICD-10-CM

## 2022-04-04 DIAGNOSIS — Z23 NEED FOR HEPATITIS A VACCINATION: ICD-10-CM

## 2022-04-04 PROCEDURE — 90472 IMMUNIZATION ADMIN EACH ADD: CPT | Performed by: INTERNAL MEDICINE

## 2022-04-04 PROCEDURE — 90471 IMMUNIZATION ADMIN: CPT | Performed by: INTERNAL MEDICINE

## 2022-04-04 PROCEDURE — 90632 HEPA VACCINE ADULT IM: CPT | Performed by: INTERNAL MEDICINE

## 2022-04-04 PROCEDURE — 90746 HEPB VACCINE 3 DOSE ADULT IM: CPT | Performed by: INTERNAL MEDICINE

## 2022-04-04 NOTE — PROGRESS NOTES
Patient presented to clinic for HBV #3 and HAV #2 vaccination. Name and  verified. HAV #2 administered on left deltoid. HBV #3 administered on right deltoid. Pt tolerated well without complications. HAV and HBV series completed. Discussed home supportive care of OTC Ibuprofen or Tylenol for possible fever and pain/soreness.

## 2022-05-02 RX ORDER — METFORMIN HYDROCHLORIDE 500 MG/1
TABLET, EXTENDED RELEASE ORAL
Qty: 60 TABLET | Refills: 2 | Status: SHIPPED | OUTPATIENT
Start: 2022-05-02

## 2022-07-10 DIAGNOSIS — F41.9 ANXIETY: ICD-10-CM

## 2022-07-11 ENCOUNTER — TELEPHONE (OUTPATIENT)
Dept: INTERNAL MEDICINE CLINIC | Facility: CLINIC | Age: 52
End: 2022-07-11

## 2022-07-11 LAB — AMB EXT COVID-19 RESULT: DETECTED

## 2022-07-11 NOTE — TELEPHONE ENCOUNTER
Spoke to patient. Symptoms started 7/9 (Saturday). Home kit was positive yesterday, 7/10 (Sunday). C/o cough, congestion, fever. In comparison to when symptoms first started, patient reports feeling a bit better, although symptoms are intermittent. Discussed to keep Tylenol/Motrin around the clock for the next 24 hours. Humidifier, steam baths, push fluids. If worsening the next 24-48 hours, contact office back as he may need video visit to discuss possible RX of Paxlovid (patient has until 7/14) for the 5-day timeframe of symptom onset. When patient receives results from [facility] to also upload into [facility] to update is records. ED precautions reviewed. Patient verbalized understanding.

## 2022-07-11 NOTE — TELEPHONE ENCOUNTER
Pt is calling stating that got a Covid home test done Sunday and was positive and also today  Got a pcr done but still no results. Pt does have symptoms cough, fever, sore throat and congestion. Pt did mention he is taking tylenol, robitussin, theraflu.       Please call and advise

## 2022-07-12 RX ORDER — ESCITALOPRAM OXALATE 20 MG/1
20 TABLET ORAL DAILY
Qty: 30 TABLET | Refills: 0 | Status: SHIPPED | OUTPATIENT
Start: 2022-07-12 | End: 2022-10-13

## 2022-07-25 ENCOUNTER — PATIENT MESSAGE (OUTPATIENT)
Dept: INTERNAL MEDICINE CLINIC | Facility: CLINIC | Age: 52
End: 2022-07-25

## 2022-07-25 ENCOUNTER — TELEPHONE (OUTPATIENT)
Dept: INTERNAL MEDICINE CLINIC | Facility: CLINIC | Age: 52
End: 2022-07-25

## 2022-07-25 RX ORDER — BENZONATATE 200 MG/1
200 CAPSULE ORAL 3 TIMES DAILY PRN
Qty: 30 CAPSULE | Refills: 0 | Status: SHIPPED | OUTPATIENT
Start: 2022-07-25

## 2022-07-25 NOTE — TELEPHONE ENCOUNTER
Pt called stating that he had covid 2 weeks ago  And now is left with just cough   Pt has been taking Robitussin but it doesn't help  Pt wants to know if doctor can prescribe something    Please call back and advise

## 2022-07-25 NOTE — TELEPHONE ENCOUNTER
Patient was informed that Tessalon pearls will be sent to pharmacy; ok per MD.    Pt verbalized understanding.

## 2022-07-26 NOTE — TELEPHONE ENCOUNTER
From: Karoline Zapata  To: Josefina Tarango MD  Sent: 7/25/2022 5:35 PM CDT  Subject: Covid-19 PCR TEST RESULTS    Here is the three. PCR test results since 07/11/2022 - 07/21/2022.

## 2022-08-05 DIAGNOSIS — F41.9 ANXIETY: ICD-10-CM

## 2022-08-08 RX ORDER — ESCITALOPRAM OXALATE 20 MG/1
TABLET ORAL
Qty: 30 TABLET | Refills: 0 | OUTPATIENT
Start: 2022-08-08

## 2022-08-08 RX ORDER — METFORMIN HYDROCHLORIDE 500 MG/1
TABLET, EXTENDED RELEASE ORAL
Qty: 60 TABLET | Refills: 2 | OUTPATIENT
Start: 2022-08-08

## 2022-10-13 ENCOUNTER — OFFICE VISIT (OUTPATIENT)
Dept: INTERNAL MEDICINE CLINIC | Facility: CLINIC | Age: 52
End: 2022-10-13
Payer: COMMERCIAL

## 2022-10-13 VITALS
OXYGEN SATURATION: 97 % | SYSTOLIC BLOOD PRESSURE: 112 MMHG | DIASTOLIC BLOOD PRESSURE: 70 MMHG | HEART RATE: 69 BPM | BODY MASS INDEX: 36.07 KG/M2 | HEIGHT: 68 IN | TEMPERATURE: 98 F | WEIGHT: 238 LBS

## 2022-10-13 DIAGNOSIS — F41.9 ANXIETY: ICD-10-CM

## 2022-10-13 DIAGNOSIS — Z00.00 ANNUAL PHYSICAL EXAM: Primary | ICD-10-CM

## 2022-10-13 DIAGNOSIS — Z12.5 ENCOUNTER FOR SCREENING FOR MALIGNANT NEOPLASM OF PROSTATE: ICD-10-CM

## 2022-10-13 DIAGNOSIS — Z13.89 NEPHROPATHY SCREEN: ICD-10-CM

## 2022-10-13 DIAGNOSIS — E11.8 CONTROLLED TYPE 2 DIABETES MELLITUS WITH COMPLICATION, WITHOUT LONG-TERM CURRENT USE OF INSULIN (HCC): ICD-10-CM

## 2022-10-13 PROCEDURE — 90677 PCV20 VACCINE IM: CPT | Performed by: INTERNAL MEDICINE

## 2022-10-13 PROCEDURE — 3008F BODY MASS INDEX DOCD: CPT | Performed by: INTERNAL MEDICINE

## 2022-10-13 PROCEDURE — 90750 HZV VACC RECOMBINANT IM: CPT | Performed by: INTERNAL MEDICINE

## 2022-10-13 PROCEDURE — 99212 OFFICE O/P EST SF 10 MIN: CPT | Performed by: INTERNAL MEDICINE

## 2022-10-13 PROCEDURE — 3078F DIAST BP <80 MM HG: CPT | Performed by: INTERNAL MEDICINE

## 2022-10-13 PROCEDURE — 3061F NEG MICROALBUMINURIA REV: CPT | Performed by: INTERNAL MEDICINE

## 2022-10-13 PROCEDURE — 90472 IMMUNIZATION ADMIN EACH ADD: CPT | Performed by: INTERNAL MEDICINE

## 2022-10-13 PROCEDURE — 3044F HG A1C LEVEL LT 7.0%: CPT | Performed by: INTERNAL MEDICINE

## 2022-10-13 PROCEDURE — 90471 IMMUNIZATION ADMIN: CPT | Performed by: INTERNAL MEDICINE

## 2022-10-13 PROCEDURE — 99396 PREV VISIT EST AGE 40-64: CPT | Performed by: INTERNAL MEDICINE

## 2022-10-13 PROCEDURE — 3074F SYST BP LT 130 MM HG: CPT | Performed by: INTERNAL MEDICINE

## 2022-10-13 RX ORDER — METFORMIN HYDROCHLORIDE 500 MG/1
500 TABLET, EXTENDED RELEASE ORAL 2 TIMES DAILY WITH MEALS
Qty: 180 TABLET | Refills: 2 | Status: SHIPPED | OUTPATIENT
Start: 2022-10-13

## 2022-10-13 RX ORDER — ESCITALOPRAM OXALATE 20 MG/1
20 TABLET ORAL DAILY
Qty: 90 TABLET | Refills: 0 | Status: SHIPPED | OUTPATIENT
Start: 2022-10-13

## 2022-10-14 LAB
ABSOLUTE BASOPHILS: 73 CELLS/UL (ref 0–200)
ABSOLUTE EOSINOPHILS: 299 CELLS/UL (ref 15–500)
ABSOLUTE LYMPHOCYTES: 3453 CELLS/UL (ref 850–3900)
ABSOLUTE MONOCYTES: 533 CELLS/UL (ref 200–950)
ABSOLUTE NEUTROPHILS: 2942 CELLS/UL (ref 1500–7800)
ALBUMIN/GLOBULIN RATIO: 1.5 (CALC) (ref 1–2.5)
ALBUMIN: 4.6 G/DL (ref 3.6–5.1)
ALKALINE PHOSPHATASE: 60 U/L (ref 35–144)
ALT: 54 U/L (ref 9–46)
AST: 34 U/L (ref 10–35)
BASOPHILS: 1 %
BILIRUBIN, TOTAL: 0.6 MG/DL (ref 0.2–1.2)
BUN: 16 MG/DL (ref 7–25)
CALCIUM: 9.7 MG/DL (ref 8.6–10.3)
CARBON DIOXIDE: 28 MMOL/L (ref 20–32)
CHLORIDE: 102 MMOL/L (ref 98–110)
CHOL/HDLC RATIO: 3.1 (CALC)
CHOLESTEROL, TOTAL: 181 MG/DL
CREATININE, RANDOM URINE: 85 MG/DL (ref 20–320)
CREATININE: 0.81 MG/DL (ref 0.7–1.3)
EGFR: 107 ML/MIN/1.73M2
EOSINOPHILS: 4.1 %
GLOBULIN: 3.1 G/DL (CALC) (ref 1.9–3.7)
GLUCOSE: 94 MG/DL (ref 65–99)
HDL CHOLESTEROL: 58 MG/DL
HEMATOCRIT: 43.7 % (ref 38.5–50)
HEMOGLOBIN A1C: 6.3 % OF TOTAL HGB
HEMOGLOBIN: 14.6 G/DL (ref 13.2–17.1)
LDL-CHOLESTEROL: 100 MG/DL (CALC)
LYMPHOCYTES: 47.3 %
MCH: 28.9 PG (ref 27–33)
MCHC: 33.4 G/DL (ref 32–36)
MCV: 86.4 FL (ref 80–100)
MICROALBUMIN/CREATININE RATIO, RANDOM URINE: 2 MCG/MG CREAT
MICROALBUMIN: 0.2 MG/DL
MONOCYTES: 7.3 %
MPV: 9.9 FL (ref 7.5–12.5)
NEUTROPHILS: 40.3 %
NON-HDL CHOLESTEROL: 123 MG/DL (CALC)
PLATELET COUNT: 270 THOUSAND/UL (ref 140–400)
POTASSIUM: 5 MMOL/L (ref 3.5–5.3)
PROTEIN, TOTAL: 7.7 G/DL (ref 6.1–8.1)
PSA, TOTAL: 0.14 NG/ML
RDW: 13.8 % (ref 11–15)
RED BLOOD CELL COUNT: 5.06 MILLION/UL (ref 4.2–5.8)
SODIUM: 139 MMOL/L (ref 135–146)
TRIGLYCERIDES: 129 MG/DL
WHITE BLOOD CELL COUNT: 7.3 THOUSAND/UL (ref 3.8–10.8)

## 2022-10-19 ENCOUNTER — TELEPHONE (OUTPATIENT)
Dept: INTERNAL MEDICINE CLINIC | Facility: CLINIC | Age: 52
End: 2022-10-19

## 2022-10-19 RX ORDER — DIPHENHYDRAMINE HYDROCHLORIDE AND ZINC ACETATE 20; 1 MG/ML; MG/ML
SPRAY TOPICAL
Qty: 100 STRIP | Refills: 2 | Status: SHIPPED | OUTPATIENT
Start: 2022-10-19 | End: 2023-10-19

## 2022-10-19 RX ORDER — LANCETS 33 GAUGE
1 EACH MISCELLANEOUS 2 TIMES DAILY
Qty: 100 EACH | Refills: 2 | Status: SHIPPED | OUTPATIENT
Start: 2022-10-19

## 2022-10-19 NOTE — TELEPHONE ENCOUNTER
[facility] pharmacy is calling for pt above requesting strips and lancets.       Please advise

## 2022-10-25 ENCOUNTER — TELEPHONE (OUTPATIENT)
Dept: INTERNAL MEDICINE CLINIC | Facility: CLINIC | Age: 52
End: 2022-10-25

## 2022-10-25 NOTE — TELEPHONE ENCOUNTER
Carol from Pharmacy called stating that insurance is stating that pt is missing a cholesterol medications \"statin\"   To please send prescription

## 2022-11-15 RX ORDER — BLOOD-GLUCOSE METER
EACH MISCELLANEOUS
Refills: 0 | OUTPATIENT
Start: 2022-11-15

## 2022-12-20 RX ORDER — LANCETS 33 GAUGE
1 EACH MISCELLANEOUS 2 TIMES DAILY
Qty: 200 EACH | Refills: 2 | Status: SHIPPED | OUTPATIENT
Start: 2022-12-20

## 2023-01-14 DIAGNOSIS — F41.9 ANXIETY: ICD-10-CM

## 2023-01-16 RX ORDER — ESCITALOPRAM OXALATE 20 MG/1
TABLET ORAL
Qty: 90 TABLET | Refills: 0 | Status: SHIPPED | OUTPATIENT
Start: 2023-01-16

## 2023-04-17 ENCOUNTER — OFFICE VISIT (OUTPATIENT)
Dept: INTERNAL MEDICINE CLINIC | Facility: CLINIC | Age: 53
End: 2023-04-17
Payer: COMMERCIAL

## 2023-04-17 VITALS
OXYGEN SATURATION: 98 % | TEMPERATURE: 98 F | HEART RATE: 64 BPM | DIASTOLIC BLOOD PRESSURE: 70 MMHG | WEIGHT: 240 LBS | BODY MASS INDEX: 36 KG/M2 | SYSTOLIC BLOOD PRESSURE: 116 MMHG

## 2023-04-17 DIAGNOSIS — E11.9 CONTROLLED TYPE 2 DIABETES MELLITUS WITHOUT COMPLICATION, WITHOUT LONG-TERM CURRENT USE OF INSULIN (HCC): Primary | ICD-10-CM

## 2023-04-17 DIAGNOSIS — F41.9 ANXIETY: ICD-10-CM

## 2023-04-17 RX ORDER — METFORMIN HYDROCHLORIDE 500 MG/1
500 TABLET, EXTENDED RELEASE ORAL 2 TIMES DAILY WITH MEALS
Qty: 180 TABLET | Refills: 2 | Status: SHIPPED | OUTPATIENT
Start: 2023-04-17

## 2023-04-17 RX ORDER — ESCITALOPRAM OXALATE 20 MG/1
20 TABLET ORAL DAILY
Qty: 90 TABLET | Refills: 1 | Status: SHIPPED | OUTPATIENT
Start: 2023-04-17

## 2023-08-23 RX ORDER — METFORMIN HYDROCHLORIDE 500 MG/1
500 TABLET, EXTENDED RELEASE ORAL 2 TIMES DAILY WITH MEALS
Qty: 180 TABLET | Refills: 2 | OUTPATIENT
Start: 2023-08-23

## 2023-10-10 ENCOUNTER — TELEPHONE (OUTPATIENT)
Dept: INTERNAL MEDICINE CLINIC | Facility: CLINIC | Age: 53
End: 2023-10-10

## 2023-10-10 DIAGNOSIS — Z00.00 PREVENTATIVE HEALTH CARE: ICD-10-CM

## 2023-10-10 DIAGNOSIS — E11.9 CONTROLLED TYPE 2 DIABETES MELLITUS WITHOUT COMPLICATION, WITHOUT LONG-TERM CURRENT USE OF INSULIN (HCC): Primary | ICD-10-CM

## 2023-10-10 DIAGNOSIS — Z12.5 SCREENING PSA (PROSTATE SPECIFIC ANTIGEN): ICD-10-CM

## 2023-10-10 DIAGNOSIS — Z12.11 ENCOUNTER FOR FIT (FECAL IMMUNOCHEMICAL TEST) SCREENING: ICD-10-CM

## 2023-10-10 DIAGNOSIS — Z13.89 NEPHROPATHY SCREEN: ICD-10-CM

## 2023-10-10 NOTE — TELEPHONE ENCOUNTER
Tutu Lloyd MD  [facility] Clinical Staff5 minutes ago (12:34 PM)       Lab orders placed. Has upcoming appt with me next week.    Please see if he will obtain labs prior to visit while fasting and also fit test ordered see if he will  envelope and complete prior to visit or bring to visit with him       LMTCB

## 2023-10-14 ENCOUNTER — LAB ENCOUNTER (OUTPATIENT)
Dept: LAB | Facility: HOSPITAL | Age: 53
End: 2023-10-14
Attending: INTERNAL MEDICINE
Payer: COMMERCIAL

## 2023-10-14 DIAGNOSIS — Z12.5 SCREENING PSA (PROSTATE SPECIFIC ANTIGEN): ICD-10-CM

## 2023-10-14 LAB
ALBUMIN SERPL-MCNC: 3.7 G/DL (ref 3.4–5)
ALBUMIN/GLOB SERPL: 1 {RATIO} (ref 1–2)
ALP LIVER SERPL-CCNC: 67 U/L
ALT SERPL-CCNC: 44 U/L
ANION GAP SERPL CALC-SCNC: 4 MMOL/L (ref 0–18)
AST SERPL-CCNC: 17 U/L (ref 15–37)
BASOPHILS # BLD AUTO: 0.06 X10(3) UL (ref 0–0.2)
BASOPHILS NFR BLD AUTO: 1 %
BILIRUB SERPL-MCNC: 0.4 MG/DL (ref 0.1–2)
BUN BLD-MCNC: 13 MG/DL (ref 7–18)
BUN/CREAT SERPL: 15.3 (ref 10–20)
CALCIUM BLD-MCNC: 9.1 MG/DL (ref 8.5–10.1)
CHLORIDE SERPL-SCNC: 108 MMOL/L (ref 98–112)
CHOLEST SERPL-MCNC: 164 MG/DL (ref ?–200)
CO2 SERPL-SCNC: 28 MMOL/L (ref 21–32)
COMPLEXED PSA SERPL-MCNC: 0.18 NG/ML (ref ?–4)
CREAT BLD-MCNC: 0.85 MG/DL
CREAT UR-SCNC: 85.5 MG/DL
DEPRECATED RDW RBC AUTO: 43.8 FL (ref 35.1–46.3)
EGFRCR SERPLBLD CKD-EPI 2021: 105 ML/MIN/1.73M2 (ref 60–?)
EOSINOPHIL # BLD AUTO: 0.2 X10(3) UL (ref 0–0.7)
EOSINOPHIL NFR BLD AUTO: 3.5 %
ERYTHROCYTE [DISTWIDTH] IN BLOOD BY AUTOMATED COUNT: 13.7 % (ref 11–15)
FASTING PATIENT LIPID ANSWER: YES
FASTING STATUS PATIENT QL REPORTED: YES
GLOBULIN PLAS-MCNC: 3.7 G/DL (ref 2.8–4.4)
GLUCOSE BLD-MCNC: 109 MG/DL (ref 70–99)
HCT VFR BLD AUTO: 44 %
HDLC SERPL-MCNC: 53 MG/DL (ref 40–59)
HGB BLD-MCNC: 14.2 G/DL
IMM GRANULOCYTES # BLD AUTO: 0.01 X10(3) UL (ref 0–1)
IMM GRANULOCYTES NFR BLD: 0.2 %
LDLC SERPL CALC-MCNC: 94 MG/DL (ref ?–100)
LYMPHOCYTES # BLD AUTO: 2.79 X10(3) UL (ref 1–4)
LYMPHOCYTES NFR BLD AUTO: 48.7 %
MCH RBC QN AUTO: 28.2 PG (ref 26–34)
MCHC RBC AUTO-ENTMCNC: 32.3 G/DL (ref 31–37)
MCV RBC AUTO: 87.3 FL
MICROALBUMIN UR-MCNC: <0.5 MG/DL
MONOCYTES # BLD AUTO: 0.45 X10(3) UL (ref 0.1–1)
MONOCYTES NFR BLD AUTO: 7.9 %
NEUTROPHILS # BLD AUTO: 2.22 X10 (3) UL (ref 1.5–7.7)
NEUTROPHILS # BLD AUTO: 2.22 X10(3) UL (ref 1.5–7.7)
NEUTROPHILS NFR BLD AUTO: 38.7 %
NONHDLC SERPL-MCNC: 111 MG/DL (ref ?–130)
OSMOLALITY SERPL CALC.SUM OF ELEC: 291 MOSM/KG (ref 275–295)
PLATELET # BLD AUTO: 283 10(3)UL (ref 150–450)
POTASSIUM SERPL-SCNC: 4.7 MMOL/L (ref 3.5–5.1)
PROT SERPL-MCNC: 7.4 G/DL (ref 6.4–8.2)
RBC # BLD AUTO: 5.04 X10(6)UL
SODIUM SERPL-SCNC: 140 MMOL/L (ref 136–145)
TRIGL SERPL-MCNC: 90 MG/DL (ref 30–149)
VLDLC SERPL CALC-MCNC: 15 MG/DL (ref 0–30)
WBC # BLD AUTO: 5.7 X10(3) UL (ref 4–11)

## 2023-10-14 PROCEDURE — 36415 COLL VENOUS BLD VENIPUNCTURE: CPT | Performed by: INTERNAL MEDICINE

## 2023-10-14 PROCEDURE — 3061F NEG MICROALBUMINURIA REV: CPT | Performed by: INTERNAL MEDICINE

## 2023-10-14 PROCEDURE — 85025 COMPLETE CBC W/AUTO DIFF WBC: CPT | Performed by: INTERNAL MEDICINE

## 2023-10-14 PROCEDURE — 80061 LIPID PANEL: CPT | Performed by: INTERNAL MEDICINE

## 2023-10-14 PROCEDURE — 82043 UR ALBUMIN QUANTITATIVE: CPT | Performed by: INTERNAL MEDICINE

## 2023-10-14 PROCEDURE — 80053 COMPREHEN METABOLIC PANEL: CPT | Performed by: INTERNAL MEDICINE

## 2023-10-14 PROCEDURE — 82570 ASSAY OF URINE CREATININE: CPT | Performed by: INTERNAL MEDICINE

## 2023-10-14 PROCEDURE — 83036 HEMOGLOBIN GLYCOSYLATED A1C: CPT | Performed by: INTERNAL MEDICINE

## 2023-10-14 PROCEDURE — 3044F HG A1C LEVEL LT 7.0%: CPT | Performed by: INTERNAL MEDICINE

## 2023-10-17 ENCOUNTER — OFFICE VISIT (OUTPATIENT)
Dept: INTERNAL MEDICINE CLINIC | Facility: CLINIC | Age: 53
End: 2023-10-17

## 2023-10-17 VITALS
DIASTOLIC BLOOD PRESSURE: 68 MMHG | TEMPERATURE: 98 F | BODY MASS INDEX: 35.27 KG/M2 | OXYGEN SATURATION: 97 % | SYSTOLIC BLOOD PRESSURE: 106 MMHG | HEIGHT: 67.75 IN | WEIGHT: 230 LBS | HEART RATE: 66 BPM

## 2023-10-17 DIAGNOSIS — E11.9 CONTROLLED TYPE 2 DIABETES MELLITUS WITHOUT COMPLICATION, WITHOUT LONG-TERM CURRENT USE OF INSULIN (HCC): ICD-10-CM

## 2023-10-17 DIAGNOSIS — Z00.00 ANNUAL PHYSICAL EXAM: Primary | ICD-10-CM

## 2023-10-17 PROCEDURE — 3078F DIAST BP <80 MM HG: CPT | Performed by: INTERNAL MEDICINE

## 2023-10-17 PROCEDURE — 3074F SYST BP LT 130 MM HG: CPT | Performed by: INTERNAL MEDICINE

## 2023-10-17 PROCEDURE — 3008F BODY MASS INDEX DOCD: CPT | Performed by: INTERNAL MEDICINE

## 2023-10-17 PROCEDURE — 99396 PREV VISIT EST AGE 40-64: CPT | Performed by: INTERNAL MEDICINE

## 2023-10-18 LAB
EST. AVERAGE GLUCOSE BLD GHB EST-MCNC: 137 MG/DL (ref 68–126)
HBA1C MFR BLD: 6.4 % (ref ?–5.7)

## 2023-11-21 DIAGNOSIS — F41.9 ANXIETY: ICD-10-CM

## 2023-11-22 RX ORDER — ESCITALOPRAM OXALATE 20 MG/1
20 TABLET ORAL DAILY
Qty: 90 TABLET | Refills: 1 | Status: SHIPPED | OUTPATIENT
Start: 2023-11-22

## 2024-01-04 ENCOUNTER — TELEPHONE (OUTPATIENT)
Dept: INTERNAL MEDICINE CLINIC | Facility: CLINIC | Age: 54
End: 2024-01-04

## 2024-01-04 NOTE — TELEPHONE ENCOUNTER
Spoke with Pt. Relayed PCP message about completing antibiotics and using OTC medications to treat cough. Pt advised that no xray needed at this time. Pt advised to follow up with PCP in 2 weeks time if he is still having a cough. Pt voiced understanding.

## 2024-01-04 NOTE — TELEPHONE ENCOUNTER
Pt is calling stating that last week on Friday went to  and was tested positive for strep. Pt states that was given medication but that cough is still there and feels some shortness of breath. Pt also mention that was negative for covid or flu. Pt did request to talk to elliot massey and would like for doctor to put in an order for x-ray.        Please call and advise

## 2024-04-08 RX ORDER — METFORMIN HYDROCHLORIDE 500 MG/1
500 TABLET, EXTENDED RELEASE ORAL 2 TIMES DAILY WITH MEALS
Qty: 180 TABLET | Refills: 0 | Status: SHIPPED | OUTPATIENT
Start: 2024-04-08

## 2024-05-25 DIAGNOSIS — F41.9 ANXIETY: ICD-10-CM

## 2024-05-28 NOTE — TELEPHONE ENCOUNTER
Future Appointment:No future appt      Last Appointment:10/17/23      Last Refill:11/22/23      Medication Requested:   Requested Prescriptions     Pending Prescriptions Disp Refills    ESCITALOPRAM 20 MG Oral Tab [Pharmacy Med Name: ESCITALOPRAM 20 MG TABLET] 90 tablet 1     Sig: TAKE 1 TABLET BY MOUTH EVERY DAY       Protocol :       Psychiatric Non-Scheduled (Anti-Anxiety) [id number]/25/2024 07:15 AM   Protocol Details In person appointment or virtual visit in the past 6 mos or appointment in next 3 mos    Depression Screening completed within the past 12 months

## 2024-05-29 RX ORDER — ESCITALOPRAM OXALATE 20 MG/1
20 TABLET ORAL DAILY
Qty: 90 TABLET | Refills: 1 | Status: SHIPPED | OUTPATIENT
Start: 2024-05-29

## 2024-07-05 RX ORDER — METFORMIN HYDROCHLORIDE 500 MG/1
500 TABLET, EXTENDED RELEASE ORAL 2 TIMES DAILY WITH MEALS
Qty: 180 TABLET | Refills: 0 | OUTPATIENT
Start: 2024-07-05

## 2024-08-09 RX ORDER — METFORMIN HYDROCHLORIDE 500 MG/1
500 TABLET, EXTENDED RELEASE ORAL 2 TIMES DAILY WITH MEALS
Qty: 60 TABLET | Refills: 0 | Status: SHIPPED | OUTPATIENT
Start: 2024-08-09 | End: 2024-08-29

## 2024-08-09 RX ORDER — METFORMIN HYDROCHLORIDE 500 MG/1
500 TABLET, EXTENDED RELEASE ORAL 2 TIMES DAILY WITH MEALS
Qty: 180 TABLET | Refills: 0 | OUTPATIENT
Start: 2024-08-09

## 2024-08-29 ENCOUNTER — LAB ENCOUNTER (OUTPATIENT)
Dept: LAB | Facility: HOSPITAL | Age: 54
End: 2024-08-29
Attending: INTERNAL MEDICINE
Payer: COMMERCIAL

## 2024-08-29 ENCOUNTER — HOSPITAL ENCOUNTER (OUTPATIENT)
Dept: GENERAL RADIOLOGY | Facility: HOSPITAL | Age: 54
Discharge: HOME | End: 2024-08-29
Attending: INTERNAL MEDICINE
Payer: COMMERCIAL

## 2024-08-29 ENCOUNTER — OFFICE VISIT (OUTPATIENT)
Dept: INTERNAL MEDICINE CLINIC | Facility: CLINIC | Age: 54
End: 2024-08-29
Payer: COMMERCIAL

## 2024-08-29 VITALS
BODY MASS INDEX: 34.84 KG/M2 | OXYGEN SATURATION: 98 % | SYSTOLIC BLOOD PRESSURE: 110 MMHG | DIASTOLIC BLOOD PRESSURE: 70 MMHG | WEIGHT: 222 LBS | HEIGHT: 67 IN | HEART RATE: 78 BPM

## 2024-08-29 DIAGNOSIS — M54.2 CERVICAL PAIN (NECK): Primary | ICD-10-CM

## 2024-08-29 DIAGNOSIS — M54.2 CERVICAL PAIN (NECK): ICD-10-CM

## 2024-08-29 DIAGNOSIS — M79.645 PAIN OF LEFT THUMB: ICD-10-CM

## 2024-08-29 DIAGNOSIS — M54.12 CERVICAL RADICULOPATHY: ICD-10-CM

## 2024-08-29 DIAGNOSIS — E11.8 CONTROLLED TYPE 2 DIABETES MELLITUS WITH COMPLICATION, WITHOUT LONG-TERM CURRENT USE OF INSULIN (HCC): ICD-10-CM

## 2024-08-29 LAB
EST. AVERAGE GLUCOSE BLD GHB EST-MCNC: 128 MG/DL (ref 68–126)
HBA1C MFR BLD: 6.1 % (ref ?–5.7)

## 2024-08-29 PROCEDURE — 73130 X-RAY EXAM OF HAND: CPT | Performed by: INTERNAL MEDICINE

## 2024-08-29 PROCEDURE — 99214 OFFICE O/P EST MOD 30 MIN: CPT | Performed by: INTERNAL MEDICINE

## 2024-08-29 PROCEDURE — 72050 X-RAY EXAM NECK SPINE 4/5VWS: CPT | Performed by: INTERNAL MEDICINE

## 2024-08-29 PROCEDURE — 36415 COLL VENOUS BLD VENIPUNCTURE: CPT | Performed by: INTERNAL MEDICINE

## 2024-08-29 PROCEDURE — 83036 HEMOGLOBIN GLYCOSYLATED A1C: CPT | Performed by: INTERNAL MEDICINE

## 2024-08-29 RX ORDER — SEMAGLUTIDE 0.68 MG/ML
0.5 INJECTION, SOLUTION SUBCUTANEOUS WEEKLY
Qty: 1 EACH | Refills: 1 | Status: SHIPPED | OUTPATIENT
Start: 2024-08-29

## 2024-08-29 RX ORDER — CYCLOBENZAPRINE HCL 10 MG
10 TABLET ORAL NIGHTLY PRN
Qty: 30 TABLET | Refills: 1 | Status: SHIPPED | OUTPATIENT
Start: 2024-08-29

## 2024-08-29 RX ORDER — METFORMIN HCL 500 MG
500 TABLET, EXTENDED RELEASE 24 HR ORAL 2 TIMES DAILY WITH MEALS
Qty: 180 TABLET | Refills: 1 | Status: SHIPPED | OUTPATIENT
Start: 2024-08-29

## 2024-08-29 RX ORDER — SEMAGLUTIDE 0.68 MG/ML
0.25 INJECTION, SOLUTION SUBCUTANEOUS WEEKLY
Qty: 1 EACH | Refills: 0 | Status: SHIPPED | OUTPATIENT
Start: 2024-08-29

## 2024-08-29 RX ORDER — NAPROXEN 500 MG/1
500 TABLET ORAL 2 TIMES DAILY PRN
Qty: 60 TABLET | Refills: 1 | Status: SHIPPED | OUTPATIENT
Start: 2024-08-29

## 2024-08-29 NOTE — PROGRESS NOTES
[facility]  Return Patient Progress Note      HPI:     Chief Complaint   Patient presents with    Follow - Up     6 months f/u - pt is fasting   Last A1c 6.4 - 10/2024    Shoulder Pain     Pain in both shoulders traveling to both hands numbing        Bridger Ortiz is a 53 year old male presenting for:    Has a significant  has a past medical history of Anxiety and Cutaneous skin tags (8/24/2018).    DM II on Metformin.  Would like to trial semaglutide.       Neck pain.  Right arm pain.  Left thumb pain reported.  NO injury.        Labs:   CMP:  Lab Results   Component Value Date/Time     10/14/2023 09:38 AM    K 4.7 10/14/2023 09:38 AM     10/14/2023 09:38 AM    CO2 28.0 10/14/2023 09:38 AM    CREATSERUM 0.85 10/14/2023 09:38 AM    CA 9.1 10/14/2023 09:38 AM     (H) 10/14/2023 09:38 AM    TP 7.4 10/14/2023 09:38 AM    ALB 3.7 10/14/2023 09:38 AM    ALKPHO 67 10/14/2023 09:38 AM    AST 17 10/14/2023 09:38 AM    ALT 44 10/14/2023 09:38 AM    BILT 0.4 10/14/2023 09:38 AM        CBC:  Lab Results   Component Value Date    WBC 5.7 10/14/2023    HGB 14.2 10/14/2023    HCT 44.0 10/14/2023    .0 10/14/2023    NEPERCENT 38.7 10/14/2023    LYPERCENT 48.7 10/14/2023    MOPERCENT 7.9 10/14/2023    EOPERCENT 3.5 10/14/2023    BAPERCENT 1.0 10/14/2023    NE 2.22 10/14/2023    LYMABS 2.79 10/14/2023    MOABSO 0.45 10/14/2023    EOABSO 0.20 10/14/2023    BAABSO 0.06 10/14/2023          Hemoglobin A1C, Microalbumin  Lab Results   Component Value Date/Time    A1C  10/14/2023 09:38 AM      Comment:      Sent to [facility] for testing.        A1C 6.4 (H) 10/14/2023 09:38 AM        Lipid panel  Lab Results   Component Value Date/Time    CHOLEST 164 10/14/2023 09:38 AM    HDL 53 10/14/2023 09:38 AM    TRIG 90 10/14/2023 09:38 AM    LDL 94 10/14/2023 09:38 AM    NONHDLC 111 10/14/2023 09:38 AM        Medications:  Current Outpatient Medications   Medication Sig Dispense Refill     metFORMIN  MG Oral Tablet 24 Hr Take 1 tablet (500 mg total) by mouth 2 (two) times daily with meals. 180 tablet 1    cyclobenzaprine 10 MG Oral Tab Take 1 tablet (10 mg total) by mouth nightly as needed. 30 tablet 1    naproxen 500 MG Oral Tab Take 1 tablet (500 mg total) by mouth 2 (two) times daily as needed. 60 tablet 1    semaglutide (OZEMPIC, 0.25 OR 0.5 MG/DOSE,) 2 MG/3ML Subcutaneous Solution Pen-injector Inject 0.25 mg into the skin once a week. 1 each 0    semaglutide (OZEMPIC, 0.25 OR 0.5 MG/DOSE,) 2 MG/3ML Subcutaneous Solution Pen-injector Inject 0.5 mg into the skin once a week. 1 each 1    ESCITALOPRAM 20 MG Oral Tab TAKE 1 TABLET BY MOUTH EVERY DAY 90 tablet 1    Lancets Micro Thin 33G Does not apply Misc 1 each 2 (two) times a day. 200 each 2    meclizine 25 MG Oral Tab Take 1 tablet (25 mg total) by mouth 3 (three) times daily as needed. 30 tablet 0    aspirin (ASPIRIN EC LOW DOSE) 81 MG Oral Tab EC Take 1 tablet (81 mg total) by mouth daily. 90 tablet 1    Ergocalciferol 2500 units Oral Cap Take 2 capsules by mouth daily. 180 capsule 0      PMH:  Past Medical History:    Anxiety    Cutaneous skin tags         PSH:  Past Surgical History:   Procedure Laterality Date    Excis spermatocele Left 2007    Removal of added skin tags  8/31/2018    Removal of skin tags  8/31/2018       Allergies:  No Known Allergies   Social History:  Social History     Socioeconomic History    Marital status:    Tobacco Use    Smoking status: Never    Smokeless tobacco: Never   Substance and Sexual Activity    Alcohol use: Yes     Comment: socially    Drug use: No      Family History:  Family History   Problem Relation Age of Onset    Diabetes Father     Hypertension Father     Diabetes Mother     Hypertension Mother     No Known Problems Daughter     No Known Problems Son     Diabetes Brother     No Known Problems Son               REVIEW OF SYSTEMS:   Review of Systems   Constitutional:  Negative for  chills, fatigue, fever and unexpected weight change.   HENT:  Negative for congestion, ear pain, hearing loss, rhinorrhea, sinus pain and sore throat.    Eyes:  Negative for pain, redness and visual disturbance.   Respiratory:  Negative for apnea, cough, chest tightness, shortness of breath and wheezing.    Cardiovascular:  Negative for chest pain, palpitations and leg swelling.   Gastrointestinal:  Negative for abdominal distention, abdominal pain, blood in stool, constipation and nausea.   Endocrine: Negative for cold intolerance, heat intolerance and polyuria.   Genitourinary:  Negative for dysuria, hematuria and urgency.   Musculoskeletal:  Positive for joint swelling and neck pain. Negative for arthralgias, back pain, gait problem and myalgias.   Skin:  Negative for rash and wound.   Allergic/Immunologic: Negative for food allergies and immunocompromised state.   Neurological:  Negative for dizziness, seizures, facial asymmetry, speech difficulty, weakness, light-headedness, numbness and headaches.   Hematological:  Negative for adenopathy. Does not bruise/bleed easily.   Psychiatric/Behavioral:  Negative for behavioral problems, sleep disturbance and suicidal ideas. The patient is not nervous/anxious.             PHYSICAL EXAM:   /70   Pulse 78   Ht 5' 7\" (1.702 m)   Wt 222 lb (100.7 kg)   SpO2 98%   BMI 34.77 kg/m²  Estimated body mass index is 34.77 kg/m² as calculated from the following:    Height as of this encounter: 5' 7\" (1.702 m).    Weight as of this encounter: 222 lb (100.7 kg).     Wt Readings from Last 3 Encounters:   08/29/24 222 lb (100.7 kg)   10/17/23 230 lb (104.3 kg)   04/17/23 240 lb (108.9 kg)       Physical Exam  Vitals reviewed.   Constitutional:       General: He is not in acute distress.     Appearance: He is well-developed.   HENT:      Head: Normocephalic and atraumatic.   Eyes:      Conjunctiva/sclera: Conjunctivae normal.      Pupils: Pupils are equal, round, and reactive  to light.   Neck:      Thyroid: No thyromegaly.   Cardiovascular:      Rate and Rhythm: Normal rate and regular rhythm.      Heart sounds: Normal heart sounds, S1 normal and S2 normal. No murmur heard.     No friction rub. No gallop.   Pulmonary:      Effort: Pulmonary effort is normal. No respiratory distress.      Breath sounds: Normal breath sounds. No wheezing or rales.   Chest:      Chest wall: No tenderness.   Abdominal:      General: Bowel sounds are normal. There is no distension.      Palpations: Abdomen is soft. There is no mass.      Tenderness: There is no abdominal tenderness. There is no guarding or rebound.   Musculoskeletal:         General: No tenderness. Normal range of motion.      Cervical back: Normal range of motion.   Lymphadenopathy:      Cervical: No cervical adenopathy.   Skin:     General: Skin is warm.      Findings: No erythema or rash.   Neurological:      Mental Status: He is alert and oriented to person, place, and time.      Cranial Nerves: No cranial nerve deficit.      Deep Tendon Reflexes: Reflexes are normal and symmetric.   Psychiatric:         Behavior: Behavior normal.         Thought Content: Thought content normal.         Judgment: Judgment normal.               ASSESSMENT AND PLAN:   Patient is a 53 year old male who presents primarily presents for:    (M54.2) Cervical pain (neck)  (primary encounter diagnosis)  Plan: XR CERVICAL SPINE (4VIEWS) (CPT=72050)        Physical therapy.   NSAID and Flexeril.      (M54.12) Cervical radiculopathy  Plan: Physical Therapy Referral - [facility]            (E11.8) Controlled type 2 diabetes mellitus with complication, without long-term current use of insulin ([facility])  Plan: Hemoglobin A1C (Glycohemoglobin) [E]        Will start semaglutide.  BMI is above 30.  Underlying comorbid conditions.      (M79.645) Pain of left thumb  Plan: XR HAND (2 VIEWS), LEFT (CPT=73120)        No injury.  Xray ordered.              Prysm  Maintenance:    Health Maintenance   Topic Date Due    Colorectal Cancer Screening  Never done    DTaP,Tdap,and Td Vaccines (1 - Tdap) Never done    COVID-19 Vaccine (4 - 2023-24 season) 09/01/2023    Diabetes Care A1C  04/14/2024    Annual Physical  10/17/2024    Influenza Vaccine (1) 10/01/2024    Diabetes Care: GFR  10/14/2024    Diabetes Care: Microalb/Creat Ratio  10/14/2024    Diabetes Care Foot Exam  10/17/2024    Diabetes Care Dilated Eye Exam  03/15/2025    PSA  10/14/2025    Annual Depression Screening  Completed    Pneumococcal Vaccine: Birth to 64yrs  Completed    Zoster Vaccines  Completed         Meds & Refills for this Visit:  Requested Prescriptions     Signed Prescriptions Disp Refills    metFORMIN  MG Oral Tablet 24 Hr 180 tablet 1     Sig: Take 1 tablet (500 mg total) by mouth 2 (two) times daily with meals.    cyclobenzaprine 10 MG Oral Tab 30 tablet 1     Sig: Take 1 tablet (10 mg total) by mouth nightly as needed.    naproxen 500 MG Oral Tab 60 tablet 1     Sig: Take 1 tablet (500 mg total) by mouth 2 (two) times daily as needed.    semaglutide (OZEMPIC, 0.25 OR 0.5 MG/DOSE,) 2 MG/3ML Subcutaneous Solution Pen-injector 1 each 0     Sig: Inject 0.25 mg into the skin once a week.    semaglutide (OZEMPIC, 0.25 OR 0.5 MG/DOSE,) 2 MG/3ML Subcutaneous Solution Pen-injector 1 each 1     Sig: Inject 0.5 mg into the skin once a week.       Orders Placed This Encounter   Procedures    Hemoglobin A1C (Glycohemoglobin) [E]       Imaging & Consults:  PHYSICAL THERAPY - INTERNAL  XR HAND (2 VIEWS), LEFT (CPT=73120)          Return in about 2 months (around 10/29/2024) for Annual Physical.  Important follow up notes/labs for next visit      Patient indicates understanding of the above recommendations and agrees to the above plan.  Reasurrance and education provided. All questions answered.    Notified to call with any questions, complications, allergies, or worsening or changing symptoms as well as any  side effects or complications from the treatments .  Red flags/ ER precautions discussed.    If diagnostic labs or imaging ordered advised patient to contact my office for results  24-48 hours after completion    This note was dictated using dragon speech recognition transcription software.  Typographical and transcription errors may be present.  Please call if any questions.             Cory Mendoza MD  [facility]

## 2024-08-30 ENCOUNTER — NURSE ONLY (OUTPATIENT)
Facility: CLINIC | Age: 54
End: 2024-08-30

## 2024-08-30 DIAGNOSIS — Z12.11 SCREENING FOR COLON CANCER: Primary | ICD-10-CM

## 2024-08-30 NOTE — PROGRESS NOTES
Scheduled for:  Colonoscopy 34908  Provider Name:  Dr Grayson  Date:  11/27/2024  Location:  [facility]  Sedation:  MAC  Time:  (pt is aware that [facility] will call the day before to confirm arrival time)  Prep:  Golytely. Bowel prep was sent to pharmacy on file.  Meds/Allergies Reconciled?:  Yes  Diagnosis with codes:    CRC screening Z12.11  Was patient informed to call insurance with codes (Y/N):  Yes  Referral sent?:  Referral was sent at the time of electronic surgical scheduling.  EM or [facility] notified?:  I sent an electronic request to Endo Scheduling and received a confirmation today.  Medication Orders:  Patient is aware to NOT take iron pills, herbal meds and diet supplements for 7 days before exam.  Hold Ozempic and vitamins 7 days prior to the procedure.  Hold Metformin the day prior and the day of the procedure.  Also to NOT take any form of alcohol, recreational drugs and any forms of ED meds 24-72 hours before exam.   Misc Orders:  Patient understand his ride needs to stay in the waiting area the entire time during the procedure.  Further instructions given by staff:  I discussed the prep instructions with the patient which he verbally understood. Patient is aware I will be sending prep instructions via My Chart.

## 2024-08-30 NOTE — PROGRESS NOTES
Called patient for scheduled telephone colon screening/positive FIT result.   Medications, pharmacy, and allergies verified with the patient.   Please advise on colonoscopy and bowel prep orders.     Age 45-64 y/o (Y/N):   66-76 y/o ? Route to GI provider per rotation schedule   › GI MD preference: Dr Grayson  › Insurance:  [employer]  › Last PCP Visit: 10/17/2023 with Dr Cory Mendoza  IF NO PCP within [facility] ? GI in-person consultation   › Last CBC drawn: 10/14/2023  › Date of positive FIT test: N/A  › H/W/BMI: 5'7\"/222 lb/34.76    Special comments/notes:  Telephone Colon Screening Questionnaire Yes No   Are you currently experiencing any new/abnormal GI symptoms? [] [x]   If yes, explain:     Rectal bleeding? [] [x]   Black stool? [] [x]   Dysphagia or food \"feeling stuck\" when eating? [] [x]   Intractable vomiting? [] [x]   Unexplained weight loss? [] [x]   First colonoscopy? [x] []   Family history of colon cancer? [] [x]   Any issues with anesthesia? [] [x]   If yes, explain:      Have you had a stroke, heart attack or stent placement in the last 12 months?  [] [x]   If yes ? GI in-person consultation      Personal history of resp. Issues/oxygen use/MARTA/COPD   marta [x] []   If yes, CPAP/BiPAP?     History of devices (pacemaker/defibrillator) [] [x]   History of cardiac/CVA issues/(MI/stroke) (see above) [] [x]     Medications Yes  No   Anticoagulants  Anticoagulant (Except Aspirin) ? route to RN pool to request adjustments from prescribing provider  [] [x]   Diabetic Meds  Metformin  PO ? hold DAY PRIOR and DAY OF procedure  Insulin ? route to RN pool to request adjustments from prescribing provider  [x] []   Weight loss meds (Phentermine/Vyvanse/Saxsenda/etc): Ozempic [x] []   Iron/herbal/multivitamin supplement (RX/OTC): [] [x]   Usage of marijuana, CBD &/or vape products: [] [x]

## 2024-08-30 NOTE — PROGRESS NOTES
GI Staff:  [facility] Colon Screening Orders    Please schedule: Colonoscopy [id number] with MAC OR IV (if appropriate)    Please send split dose Golytely bowel prep     Diagnosis: Colon Screening Z12.11     Medication adjustments: Hold Vitamin D and Ozempic 7 days prior to the procedure.  Day before procedure, hold: Metformin  Day of procedure, hold: Metformin    >>>Please inform patient if new medications are started after scheduling procedure they need to call clinic to notify us.

## 2024-09-19 ENCOUNTER — TELEPHONE (OUTPATIENT)
Dept: INTERNAL MEDICINE CLINIC | Facility: CLINIC | Age: 54
End: 2024-09-19

## 2024-09-19 DIAGNOSIS — E11.9 TYPE 2 DIABETES MELLITUS WITHOUT COMPLICATION, WITHOUT LONG-TERM CURRENT USE OF INSULIN (HCC): Primary | ICD-10-CM

## 2024-09-19 NOTE — TELEPHONE ENCOUNTER
Faxed request for Ozempic prior auth submission received from [facility].    PA completed and faxed to [facility] ([phone] Specialty Drug Auths)     Await PA decision

## 2024-10-17 ENCOUNTER — LAB ENCOUNTER (OUTPATIENT)
Dept: LAB | Facility: HOSPITAL | Age: 54
End: 2024-10-17
Attending: INTERNAL MEDICINE
Payer: COMMERCIAL

## 2024-10-17 ENCOUNTER — MED REC SCAN ONLY (OUTPATIENT)
Dept: INTERNAL MEDICINE CLINIC | Facility: CLINIC | Age: 54
End: 2024-10-17

## 2024-10-17 ENCOUNTER — OFFICE VISIT (OUTPATIENT)
Dept: INTERNAL MEDICINE CLINIC | Facility: CLINIC | Age: 54
End: 2024-10-17
Payer: COMMERCIAL

## 2024-10-17 VITALS
BODY MASS INDEX: 35.63 KG/M2 | WEIGHT: 227 LBS | HEART RATE: 67 BPM | SYSTOLIC BLOOD PRESSURE: 100 MMHG | DIASTOLIC BLOOD PRESSURE: 70 MMHG | HEIGHT: 67 IN | OXYGEN SATURATION: 99 %

## 2024-10-17 DIAGNOSIS — Z13.89 NEPHROPATHY SCREEN: ICD-10-CM

## 2024-10-17 DIAGNOSIS — Z00.00 ANNUAL PHYSICAL EXAM: Primary | ICD-10-CM

## 2024-10-17 DIAGNOSIS — E11.9 TYPE 2 DIABETES MELLITUS WITHOUT COMPLICATION, WITHOUT LONG-TERM CURRENT USE OF INSULIN (HCC): ICD-10-CM

## 2024-10-17 LAB
ALBUMIN SERPL-MCNC: 4.6 G/DL (ref 3.2–4.8)
ALBUMIN/GLOB SERPL: 1.7 {RATIO} (ref 1–2)
ALP LIVER SERPL-CCNC: 54 U/L
ALT SERPL-CCNC: 37 U/L
ANION GAP SERPL CALC-SCNC: 6 MMOL/L (ref 0–18)
AST SERPL-CCNC: 22 U/L (ref ?–34)
BILIRUB SERPL-MCNC: 0.7 MG/DL (ref 0.3–1.2)
BUN BLD-MCNC: 14 MG/DL (ref 9–23)
BUN/CREAT SERPL: 17.9 (ref 10–20)
CALCIUM BLD-MCNC: 9.2 MG/DL (ref 8.7–10.4)
CHLORIDE SERPL-SCNC: 106 MMOL/L (ref 98–112)
CHOLEST SERPL-MCNC: 154 MG/DL (ref ?–200)
CO2 SERPL-SCNC: 28 MMOL/L (ref 21–32)
CREAT BLD-MCNC: 0.78 MG/DL
CREAT UR-SCNC: 91.9 MG/DL
EGFRCR SERPLBLD CKD-EPI 2021: 107 ML/MIN/1.73M2 (ref 60–?)
FASTING PATIENT LIPID ANSWER: YES
FASTING STATUS PATIENT QL REPORTED: YES
GLOBULIN PLAS-MCNC: 2.7 G/DL (ref 2–3.5)
GLUCOSE BLD-MCNC: 93 MG/DL (ref 70–99)
HDLC SERPL-MCNC: 51 MG/DL (ref 40–59)
LDLC SERPL CALC-MCNC: 86 MG/DL (ref ?–100)
MICROALBUMIN UR-MCNC: <0.3 MG/DL
NONHDLC SERPL-MCNC: 103 MG/DL (ref ?–130)
OSMOLALITY SERPL CALC.SUM OF ELEC: 290 MOSM/KG (ref 275–295)
POTASSIUM SERPL-SCNC: 4.2 MMOL/L (ref 3.5–5.1)
PROT SERPL-MCNC: 7.3 G/DL (ref 5.7–8.2)
SODIUM SERPL-SCNC: 140 MMOL/L (ref 136–145)
TRIGL SERPL-MCNC: 93 MG/DL (ref 30–149)
VLDLC SERPL CALC-MCNC: 15 MG/DL (ref 0–30)

## 2024-10-17 PROCEDURE — 82570 ASSAY OF URINE CREATININE: CPT | Performed by: INTERNAL MEDICINE

## 2024-10-17 PROCEDURE — 99396 PREV VISIT EST AGE 40-64: CPT | Performed by: INTERNAL MEDICINE

## 2024-10-17 PROCEDURE — 80061 LIPID PANEL: CPT | Performed by: INTERNAL MEDICINE

## 2024-10-17 PROCEDURE — 82043 UR ALBUMIN QUANTITATIVE: CPT | Performed by: INTERNAL MEDICINE

## 2024-10-17 PROCEDURE — 80053 COMPREHEN METABOLIC PANEL: CPT | Performed by: INTERNAL MEDICINE

## 2024-10-17 PROCEDURE — 36415 COLL VENOUS BLD VENIPUNCTURE: CPT | Performed by: INTERNAL MEDICINE

## 2024-10-18 ENCOUNTER — TELEPHONE (OUTPATIENT)
Dept: INTERNAL MEDICINE CLINIC | Facility: CLINIC | Age: 54
End: 2024-10-18

## 2024-11-04 RX ORDER — CYCLOBENZAPRINE HCL 10 MG
10 TABLET ORAL NIGHTLY PRN
Qty: 30 TABLET | Refills: 1 | Status: SHIPPED | OUTPATIENT
Start: 2024-11-04

## 2024-11-04 RX ORDER — NAPROXEN 500 MG/1
500 TABLET ORAL 2 TIMES DAILY PRN
Qty: 60 TABLET | Refills: 1 | Status: SHIPPED | OUTPATIENT
Start: 2024-11-04

## 2024-12-06 ENCOUNTER — TELEPHONE (OUTPATIENT)
Facility: CLINIC | Age: 54
End: 2024-12-06

## 2024-12-10 NOTE — TELEPHONE ENCOUNTER
Spoke to patient and reviewed note below. Patient verbalized understanding.       Health maintenance updated. Last colonoscopy done 11/27/24. 10 year recall placed into [facility], next due on 11/2034 per Dr. Grayson.

## 2024-12-10 NOTE — TELEPHONE ENCOUNTER
Please let him know the polyp was benign and colonoscopy is recommended for colorectal cancer screening in 10 years    Thanks    Leonard Grayson MD  [facility] - Gastroenterology  12/9/2024  8:40 PM

## 2025-01-06 RX ORDER — SEMAGLUTIDE 0.68 MG/ML
0.5 INJECTION, SOLUTION SUBCUTANEOUS WEEKLY
Refills: 1 | OUTPATIENT
Start: 2025-01-06

## 2025-01-08 ENCOUNTER — HOSPITAL ENCOUNTER (OUTPATIENT)
Age: 55
Discharge: HOME | End: 2025-01-08
Payer: COMMERCIAL

## 2025-01-08 VITALS
OXYGEN SATURATION: 100 % | DIASTOLIC BLOOD PRESSURE: 83 MMHG | SYSTOLIC BLOOD PRESSURE: 128 MMHG | TEMPERATURE: 101 F | RESPIRATION RATE: 20 BRPM | HEART RATE: 119 BPM

## 2025-01-08 DIAGNOSIS — Z11.52 ENCOUNTER FOR SCREENING FOR COVID-19: ICD-10-CM

## 2025-01-08 DIAGNOSIS — U07.1 COVID-19 VIRUS INFECTION: Primary | ICD-10-CM

## 2025-01-08 LAB
GLUCOSE BLDC GLUCOMTR-MCNC: 138 MG/DL (ref 70–99)
POCT INFLUENZA A: NEGATIVE
POCT INFLUENZA B: NEGATIVE
SARS-COV-2 RNA RESP QL NAA+PROBE: DETECTED

## 2025-01-08 RX ORDER — ALBUTEROL SULFATE 90 UG/1
2 INHALANT RESPIRATORY (INHALATION) EVERY 4 HOURS PRN
Qty: 1 EACH | Refills: 0 | Status: SHIPPED | OUTPATIENT
Start: 2025-01-08 | End: 2025-02-07

## 2025-01-08 RX ORDER — NIRMATRELVIR AND RITONAVIR 300-100 MG
3 KIT ORAL 2 TIMES DAILY
Qty: 30 EACH | Refills: 0 | Status: SHIPPED | OUTPATIENT
Start: 2025-01-08 | End: 2025-01-13

## 2025-01-08 RX ORDER — ONDANSETRON 4 MG/1
4 TABLET, ORALLY DISINTEGRATING ORAL EVERY 4 HOURS PRN
Qty: 10 TABLET | Refills: 0 | Status: SHIPPED | OUTPATIENT
Start: 2025-01-08 | End: 2025-01-15

## 2025-01-08 RX ORDER — BENZONATATE 200 MG/1
200 CAPSULE ORAL 3 TIMES DAILY PRN
Qty: 15 CAPSULE | Refills: 0 | Status: SHIPPED | OUTPATIENT
Start: 2025-01-08

## 2025-01-08 NOTE — ED INITIAL ASSESSMENT (HPI)
Pt states Sunday began having symptoms, pt states having congestion and body aches. Pt states symptoms worsened. Pt states now having fevers, coughing. Pt states having some nausea but denies vomiting or diarrhea. Pt states having right ear pain.

## 2025-01-08 NOTE — ED PROVIDER NOTES
Patient Seen in: [facility]      History     Chief Complaint   Patient presents with    Ear Problem Pain    Cough/URI     Stated Complaint: COVID EXPOSURE    Subjective:   HPI  Patient is an 54-year-old male that presents to the immediate care center today with concern for fever, chills, cough, congestion that started 3 days ago. Pt has had covid exposure.  Pt. has been eating and drinking without difficulty.  He has had no shortness of air; no abdominal or back pain; no headache or dizziness.          Objective:     Past Medical History:    Anxiety    Cutaneous skin tags              Past Surgical History:   Procedure Laterality Date    Colonoscopy N/A 11/27/2024    Procedure: COLONOSCOPY;  Surgeon: Leonard Grayson MD;  Location: [facility] MAIN OR    Excis spermatocele Left 2007    Removal of added skin tags  8/31/2018    Removal of skin tags  8/31/2018                Social History     Socioeconomic History    Marital status:    Tobacco Use    Smoking status: Never    Smokeless tobacco: Never   Substance and Sexual Activity    Alcohol use: Yes     Comment: socially    Drug use: No              Review of Systems   Constitutional:  Positive for fatigue and fever. Negative for appetite change and chills.   HENT:  Positive for congestion. Negative for sinus pressure.    Respiratory:  Positive for cough.    Gastrointestinal:  Negative for abdominal pain.   Musculoskeletal:  Negative for arthralgias and myalgias.   Skin:  Negative for rash.   Neurological:  Negative for dizziness, weakness and headaches.       Positive for stated complaint: COVID EXPOSURE  Other systems are as noted in HPI.  Constitutional and vital signs reviewed.      All other systems reviewed and negative except as noted above.    Physical Exam     ED Triage Vitals [01/08/25 1511]   /83   Pulse 119   Resp 20   Temp (!) 100.5 °F (38.1 °C)   Temp src Oral   SpO2 100 %   O2 Device None (Room air)       Current Vitals:   Vital  Signs  BP: 128/83  Pulse: 119  Resp: 20  Temp: (!) 100.5 °F (38.1 °C)  Temp src: Oral    Oxygen Therapy  SpO2: 100 %  O2 Device: None (Room air)        Physical Exam  Vitals and nursing note reviewed.   Constitutional:       General: He is not in acute distress.     Appearance: He is not ill-appearing.   HENT:      Head: Normocephalic and atraumatic.      Nose: Nose normal.   Eyes:      Conjunctiva/sclera: Conjunctivae normal.   Pulmonary:      Effort: Pulmonary effort is normal. No respiratory distress.   Musculoskeletal:      Cervical back: Normal range of motion and neck supple.   Skin:     General: Skin is warm and dry.      Findings: No rash.   Neurological:      Mental Status: He is alert and oriented to person, place, and time.             ED Course     Labs Reviewed   POCT GLUCOSE - Abnormal; Notable for the following components:       Result Value    POC Glucose  138 (*)     All other components within normal limits   RAPID SARS-COV-2 BY PCR - Abnormal; Notable for the following components:    Rapid SARS-CoV-2 by PCR Detected (*)     All other components within normal limits   POCT FLU TEST - Normal    Narrative:     This assay is a rapid molecular in vitro test utilizing nucleic acid amplification of influenza A and B viral RNA.                   MDM              Medical Decision Making  Differential diagnoses considered included, but are not exclusive of: bacterial vs viral sinusitis, dehydration, pneumonia, influenza, Covid-19 infection, and other viral upper respiratory infection.       Problems Addressed:  COVID-19 virus infection: self-limited or minor problem    Amount and/or Complexity of Data Reviewed  Labs:  Decision-making details documented in ED Course.    Risk  OTC drugs.  Prescription drug management.        Disposition and Plan     Clinical Impression:  1. COVID-19 virus infection    2. Encounter for screening for COVID-19         Disposition:  Discharge  1/8/2025  3:41  pm    Follow-up:  Your primary care provider  Call to schedule appointment to be seen in 5-7 days.    As needed          Medications Prescribed:  Discharge Medication List as of 1/8/2025  3:42 PM        START taking these medications    Details   nirmatrelvir-ritonavir (PAXLOVID, 300/100,) 300-100 MG Oral Tablet Therapy Pack Take 3 tablets by mouth 2 (two) times daily for 5 days. 300mg nirmatrelvir + 100mg ritonavir in each dose., Normal, Disp-30 each, R-0      benzonatate 200 MG Oral Cap Take 1 capsule (200 mg total) by mouth 3 (three) times daily as needed for cough., Normal, Disp-15 capsule, R-0      ondansetron 4 MG Oral Tablet Dispersible Take 1 tablet (4 mg total) by mouth every 4 (four) hours as needed for Nausea., Normal, Disp-10 tablet, R-0      albuterol 108 (90 Base) MCG/ACT Inhalation Aero Soln Inhale 2 puffs into the lungs every 4 (four) hours as needed for Wheezing., Normal, Disp-1 each, R-0                 Supplementary Documentation:

## 2025-01-10 ENCOUNTER — PATIENT MESSAGE (OUTPATIENT)
Dept: INTERNAL MEDICINE CLINIC | Facility: CLINIC | Age: 55
End: 2025-01-10

## 2025-01-13 RX ORDER — SEMAGLUTIDE 0.68 MG/ML
0.25 INJECTION, SOLUTION SUBCUTANEOUS WEEKLY
Qty: 1 EACH | Refills: 0 | Status: SHIPPED | OUTPATIENT
Start: 2025-01-13

## 2025-01-13 NOTE — TELEPHONE ENCOUNTER
Spoke to patient and he states h received a letter stating ozempic will be covered from  01/2025-01/2027..

## 2025-02-11 RX ORDER — SEMAGLUTIDE 0.68 MG/ML
0.5 INJECTION, SOLUTION SUBCUTANEOUS WEEKLY
Qty: 1 EACH | Refills: 1 | Status: SHIPPED | OUTPATIENT
Start: 2025-02-11

## 2025-02-11 NOTE — TELEPHONE ENCOUNTER
Spoke with patient and he states he woulf like to up the dose to 0.5mg, patient states he feels good and his weight is better.

## 2025-02-24 RX ORDER — METFORMIN HYDROCHLORIDE 500 MG/1
500 TABLET, EXTENDED RELEASE ORAL 2 TIMES DAILY WITH MEALS
Qty: 180 TABLET | Refills: 1 | Status: SHIPPED | OUTPATIENT
Start: 2025-02-24

## 2025-04-14 RX ORDER — SEMAGLUTIDE 0.68 MG/ML
0.5 INJECTION, SOLUTION SUBCUTANEOUS WEEKLY
Refills: 1 | OUTPATIENT
Start: 2025-04-14

## 2025-04-14 NOTE — TELEPHONE ENCOUNTER
Refill was denied, patient needs to schedule an appointment with pcp to follow up on his medication.

## 2025-04-19 ENCOUNTER — HOSPITAL ENCOUNTER (EMERGENCY)
Facility: HOSPITAL | Age: 55
Discharge: HOME | End: 2025-04-19
Attending: EMERGENCY MEDICINE
Payer: COMMERCIAL

## 2025-04-19 VITALS
DIASTOLIC BLOOD PRESSURE: 75 MMHG | BODY MASS INDEX: 34.37 KG/M2 | HEART RATE: 74 BPM | WEIGHT: 219 LBS | SYSTOLIC BLOOD PRESSURE: 118 MMHG | RESPIRATION RATE: 18 BRPM | OXYGEN SATURATION: 95 % | TEMPERATURE: 97 F | HEIGHT: 67 IN

## 2025-04-19 DIAGNOSIS — H81.10 BENIGN PAROXYSMAL POSITIONAL VERTIGO, UNSPECIFIED LATERALITY: Primary | ICD-10-CM

## 2025-04-19 LAB
ANION GAP SERPL CALC-SCNC: 11 MMOL/L (ref 0–18)
ATRIAL RATE: 84 BPM
BASOPHILS # BLD AUTO: 0.03 X10(3) UL (ref 0–0.2)
BASOPHILS NFR BLD AUTO: 0.3 %
BUN BLD-MCNC: 17 MG/DL (ref 9–23)
BUN/CREAT SERPL: 19.5 (ref 10–20)
CALCIUM BLD-MCNC: 9.4 MG/DL (ref 8.7–10.4)
CHLORIDE SERPL-SCNC: 104 MMOL/L (ref 98–112)
CO2 SERPL-SCNC: 24 MMOL/L (ref 21–32)
CREAT BLD-MCNC: 0.87 MG/DL (ref 0.7–1.3)
DEPRECATED RDW RBC AUTO: 42.7 FL (ref 35.1–46.3)
EGFRCR SERPLBLD CKD-EPI 2021: 103 ML/MIN/1.73M2 (ref 60–?)
EOSINOPHIL # BLD AUTO: 0 X10(3) UL (ref 0–0.7)
EOSINOPHIL NFR BLD AUTO: 0 %
ERYTHROCYTE [DISTWIDTH] IN BLOOD BY AUTOMATED COUNT: 14 % (ref 11–15)
GLUCOSE BLD-MCNC: 172 MG/DL (ref 70–99)
GLUCOSE BLDC GLUCOMTR-MCNC: 169 MG/DL (ref 70–99)
HCT VFR BLD AUTO: 41.1 % (ref 39–53)
HGB BLD-MCNC: 14.2 G/DL (ref 13–17.5)
IMM GRANULOCYTES # BLD AUTO: 0.03 X10(3) UL (ref 0–1)
IMM GRANULOCYTES NFR BLD: 0.3 %
LYMPHOCYTES # BLD AUTO: 1.13 X10(3) UL (ref 1–4)
LYMPHOCYTES NFR BLD AUTO: 11.5 %
MCH RBC QN AUTO: 28.8 PG (ref 26–34)
MCHC RBC AUTO-ENTMCNC: 34.5 G/DL (ref 31–37)
MCV RBC AUTO: 83.4 FL (ref 80–100)
MONOCYTES # BLD AUTO: 0.14 X10(3) UL (ref 0.1–1)
MONOCYTES NFR BLD AUTO: 1.4 %
NEUTROPHILS # BLD AUTO: 8.48 X10 (3) UL (ref 1.5–7.7)
NEUTROPHILS # BLD AUTO: 8.48 X10(3) UL (ref 1.5–7.7)
NEUTROPHILS NFR BLD AUTO: 86.5 %
OSMOLALITY SERPL CALC.SUM OF ELEC: 294 MOSM/KG (ref 275–295)
P AXIS: 23 DEGREES
P-R INTERVAL: 150 MS
PLATELET # BLD AUTO: 288 10(3)UL (ref 150–450)
POTASSIUM SERPL-SCNC: 3.8 MMOL/L (ref 3.5–5.1)
Q-T INTERVAL: 382 MS
QRS DURATION: 82 MS
QTC CALCULATION (BEZET): 451 MS
R AXIS: 17 DEGREES
RBC # BLD AUTO: 4.93 X10(6)UL (ref 4.3–5.7)
SODIUM SERPL-SCNC: 139 MMOL/L (ref 136–145)
T AXIS: 42 DEGREES
TROPONIN I SERPL HS-MCNC: <3 NG/L (ref ?–53)
VENTRICULAR RATE: 84 BPM
WBC # BLD AUTO: 9.8 X10(3) UL (ref 4–11)

## 2025-04-19 PROCEDURE — 82962 GLUCOSE BLOOD TEST: CPT

## 2025-04-19 PROCEDURE — 85025 COMPLETE CBC W/AUTO DIFF WBC: CPT | Performed by: EMERGENCY MEDICINE

## 2025-04-19 PROCEDURE — 99284 EMERGENCY DEPT VISIT MOD MDM: CPT

## 2025-04-19 PROCEDURE — 96374 THER/PROPH/DIAG INJ IV PUSH: CPT

## 2025-04-19 PROCEDURE — 93005 ELECTROCARDIOGRAM TRACING: CPT

## 2025-04-19 PROCEDURE — 96375 TX/PRO/DX INJ NEW DRUG ADDON: CPT

## 2025-04-19 PROCEDURE — 84484 ASSAY OF TROPONIN QUANT: CPT | Performed by: EMERGENCY MEDICINE

## 2025-04-19 PROCEDURE — 99285 EMERGENCY DEPT VISIT HI MDM: CPT

## 2025-04-19 PROCEDURE — 96361 HYDRATE IV INFUSION ADD-ON: CPT

## 2025-04-19 PROCEDURE — 93010 ELECTROCARDIOGRAM REPORT: CPT

## 2025-04-19 PROCEDURE — 80048 BASIC METABOLIC PNL TOTAL CA: CPT | Performed by: EMERGENCY MEDICINE

## 2025-04-19 RX ORDER — DIAZEPAM 10 MG/2ML
5 INJECTION, SOLUTION INTRAMUSCULAR; INTRAVENOUS ONCE
Status: COMPLETED | OUTPATIENT
Start: 2025-04-19 | End: 2025-04-19

## 2025-04-19 RX ORDER — ONDANSETRON 2 MG/ML
4 INJECTION INTRAMUSCULAR; INTRAVENOUS ONCE
Status: COMPLETED | OUTPATIENT
Start: 2025-04-19 | End: 2025-04-19

## 2025-04-19 RX ORDER — DIPHENHYDRAMINE HYDROCHLORIDE 50 MG/ML
25 INJECTION, SOLUTION INTRAMUSCULAR; INTRAVENOUS ONCE
Status: COMPLETED | OUTPATIENT
Start: 2025-04-19 | End: 2025-04-19

## 2025-04-19 RX ORDER — PROMETHAZINE HYDROCHLORIDE 25 MG/1
25 TABLET ORAL EVERY 6 HOURS PRN
Qty: 20 TABLET | Refills: 0 | Status: SHIPPED | OUTPATIENT
Start: 2025-04-19

## 2025-04-19 RX ORDER — MECLIZINE HYDROCHLORIDE 25 MG/1
25 TABLET ORAL ONCE
Status: COMPLETED | OUTPATIENT
Start: 2025-04-19 | End: 2025-04-19

## 2025-04-19 RX ORDER — ONDANSETRON 4 MG/1
4 TABLET, ORALLY DISINTEGRATING ORAL EVERY 4 HOURS PRN
Qty: 10 TABLET | Refills: 0 | Status: SHIPPED | OUTPATIENT
Start: 2025-04-19 | End: 2025-04-26

## 2025-04-19 RX ORDER — PROMETHAZINE HYDROCHLORIDE 25 MG/1
25 TABLET ORAL ONCE
Status: COMPLETED | OUTPATIENT
Start: 2025-04-19 | End: 2025-04-19

## 2025-04-19 NOTE — ED QUICK NOTES
Assumed care. Pt resting on ED cart with family member present at bedside. Pt states he is still dizzy - ERMD aware and present at bedside for reeval.

## 2025-04-19 NOTE — ED PROVIDER NOTES
Patient Seen in: [facility]    History     Chief Complaint   Patient presents with    Dizziness    Vomiting       HPI    74-year-old male presents ER with complaint of nausea vomiting and vertigo.  Patient states that he was watching a movie earlier today in the movie theater with a lot of quick movement which she thinks triggered his vertigo.  Patient has a history of vertigo and states that when he lays back is when it feels like the room is spinning.  Patient also complaining of vomiting.  Patient denies abdominal pain.  Patient states he has taken meclizine in the past but does not have any more    History reviewed. Past Medical History[1]    History reviewed. Past Surgical History[2]      Medications :  Prescriptions Prior to Admission[3]     Family History[4]    Smoking Status: Social Hx on file[5]    ROS  All pertinent positives for the review of systems are mentioned in the HPI  All other organ systems are reviewed and are negative.    Constitutional and vital signs reviewed.      Social History and Family History elements reviewed from today, pertinent positives to the presenting problem noted.    Physical Exam     ED Triage Vitals [04/19/25 0153]   /78   Pulse 80   Resp 20   Temp 97 °F (36.1 °C)   Temp src Temporal   SpO2 97 %   O2 Device None (Room air)       All measures to prevent infection transmission during my interaction with the patient were taken. The patient was already wearing a droplet mask on my arrival to the room. Personal protective equipment including droplet mask, eye protection, and gloves were worn throughout the duration of the exam.  Handwashing was performed prior to and after the exam.  Stethoscope and any equipment used during my examination was cleaned with super sani-cloth germicidal wipes following the exam.     Physical Exam  Vitals and nursing note reviewed.   Constitutional:       Appearance: He is well-developed.   HENT:      Head:  Normocephalic and atraumatic.      Right Ear: External ear normal.      Left Ear: External ear normal.      Nose: Nose normal.   Eyes:      Extraocular Movements:      Right eye: Nystagmus present.      Left eye: Nystagmus present.      Conjunctiva/sclera: Conjunctivae normal.      Pupils: Pupils are equal, round, and reactive to light.   Cardiovascular:      Rate and Rhythm: Normal rate and regular rhythm.      Heart sounds: Normal heart sounds.   Pulmonary:      Effort: Pulmonary effort is normal.      Breath sounds: Normal breath sounds.   Abdominal:      General: Bowel sounds are normal.      Palpations: Abdomen is soft.   Musculoskeletal:         General: Normal range of motion.      Cervical back: Normal range of motion and neck supple.   Skin:     General: Skin is warm and dry.   Neurological:      Mental Status: He is alert and oriented to person, place, and time.      Deep Tendon Reflexes: Reflexes are normal and symmetric.   Psychiatric:         Behavior: Behavior normal.         Thought Content: Thought content normal.         Judgment: Judgment normal.         ED Course        Labs Reviewed   BASIC METABOLIC PANEL (8) - Abnormal; Notable for the following components:       Result Value    Glucose 172 (*)     All other components within normal limits   CBC WITH DIFFERENTIAL WITH PLATELET - Abnormal; Notable for the following components:    Neutrophil Absolute Prelim 8.48 (*)     Neutrophil Absolute 8.48 (*)     All other components within normal limits   POCT GLUCOSE - Abnormal; Notable for the following components:    POC Glucose  169 (*)     All other components within normal limits   TROPONIN I HIGH SENSITIVITY - Normal     EKG    Rate, intervals and axes as noted on EKG Report.  Rate: 84  Rhythm: Sinus Rhythm  Reading: No ST deviation, normal axis             Imaging Results Available and Reviewed while in ED: No results found.  ED Medications Administered:   Medications   ondansetron (Zofran) 4  MG/2ML injection 4 mg (4 mg Intravenous Given 4/19/25 0235)   diazepam (Valium) 5 mg/mL injection 5 mg (5 mg Intravenous Given 4/19/25 0236)   meclizine (Antivert) tab 25 mg (25 mg Oral Given 4/19/25 0237)   sodium chloride 0.9 % IV bolus 1,000 mL (0 mL Intravenous Stopped 4/19/25 0356)   promethazine (Phenergan) tab 25 mg (25 mg Oral Given 4/19/25 0354)   diphenhydrAMINE (Benadryl) 50 mg/mL  injection 25 mg (25 mg Intravenous Given 4/19/25 0354)         MDM     Vitals:    04/19/25 0153 04/19/25 0330   BP: 140/78 125/84   Pulse: 80 83   Resp: 20 20   Temp: 97 °F (36.1 °C)    TempSrc: Temporal    SpO2: 97% 95%   Weight: 99.3 kg    Height: 170.2 cm (5' 7\")      *I personally reviewed and interpreted all ED vitals.  I also personally reviewed all labs and imaging if ordered    Pulse Ox: 97%, Room air, Normal     Monitor Interpretation:   normal sinus rhythm    Differential Diagnosis/ Diagnostic Considerations: Vertigo, plan positional vertigo, lab otitis, CVA,    Medical Record Review: I personally reviewed available prior medical records for any recent pertinent discharge summaries, testing, and procedures and reviewed those reports.    Complicating Factors: The patient already has does not have any pertinent problems on file. to contribute to the complexity of this ED evaluation.    Medical Decision Making  54-year-old male presents ER with complaint of dizziness.  Patient with past medical history of vertigo and states this feels very similar.  Patient states that Zofran improved his nausea.  Patient states that Phenergan and Benadryl helped with the vertigo has since resolved.  Patient discharged with Phenergan as well as Zofran instructed follow-up with neurology if symptoms continue.  Patient with steady gait upon discharge.  Patient by neurological deficits.  Patient's wife bedside made aware of the discharge planning and disposition.    Problems Addressed:  Benign paroxysmal positional vertigo, unspecified  laterality: acute illness or injury    Amount and/or Complexity of Data Reviewed  Independent Historian: spouse     Details: History obtained from the spouse bedside states he does have a history of vertigo but meclizine has not worked in the past  Labs: ordered. Decision-making details documented in ED Course.    Risk  Prescription drug management.        Condition upon leaving the department: Stable    Disposition and Plan     Clinical Impression:  1. Benign paroxysmal positional vertigo, unspecified laterality        Disposition:  Discharge    Follow-up:  Vanna Hanson DO  [address] [address]  [phone]    Schedule an appointment as soon as possible for a visit  If symptoms worsen      Medications Prescribed:  Current Discharge Medication List        START taking these medications    Details   promethazine 25 MG Oral Tab Take 1 tablet (25 mg total) by mouth every 6 (six) hours as needed (for dizziness).  Qty: 20 tablet, Refills: 0      ondansetron 4 MG Oral Tablet Dispersible Take 1 tablet (4 mg total) by mouth every 4 (four) hours as needed for Nausea.  Qty: 10 tablet, Refills: 0                      [1]   Past Medical History:   Anxiety    Cutaneous skin tags   [2]   Past Surgical History:  Procedure Laterality Date    Colonoscopy N/A 11/27/2024    Procedure: COLONOSCOPY;  Surgeon: Leonard Grayson MD;  Location: [facility] MAIN OR    Excis spermatocele Left 2007    Removal of added skin tags  8/31/2018    Removal of skin tags  8/31/2018   [3] (Not in a hospital admission)   [4]   Family History  Problem Relation Age of Onset    Diabetes Father     Hypertension Father     Diabetes Mother     Hypertension Mother     No Known Problems Daughter     No Known Problems Son     Diabetes Brother     No Known Problems Son    [5]   Social History  Socioeconomic History    Marital status:    Tobacco Use    Smoking status: Never    Smokeless tobacco: Never   Substance and Sexual Activity     Alcohol use: Yes     Comment: socially    Drug use: No

## 2025-04-19 NOTE — ED INITIAL ASSESSMENT (HPI)
C/o of vertigo since 8p and vomiting since 10p. Per wife has vomited appox 7times.  Denies CP, SOB,or visual changes

## 2025-04-29 NOTE — PROGRESS NOTES
[facility] part of [facility]  Return Patient Progress Note      HPI:     Chief Complaint   Patient presents with    Physical     Physical - pt is fasting        Bridger Ortiz is a 53 year old male presenting for:    Has a significant  has a past medical history of Anxiety and Cutaneous skin tags (8/24/2018).  Here for annual.    No acute symptoms.     DM II on metformin.  Last A1C in Aug 2024 well controlled.        Labs:   CMP:  Lab Results   Component Value Date/Time     10/14/2023 09:38 AM    K 4.7 10/14/2023 09:38 AM     10/14/2023 09:38 AM    CO2 28.0 10/14/2023 09:38 AM    CREATSERUM 0.85 10/14/2023 09:38 AM    CA 9.1 10/14/2023 09:38 AM     (H) 10/14/2023 09:38 AM    TP 7.4 10/14/2023 09:38 AM    ALB 3.7 10/14/2023 09:38 AM    ALKPHO 67 10/14/2023 09:38 AM    AST 17 10/14/2023 09:38 AM    ALT 44 10/14/2023 09:38 AM    BILT 0.4 10/14/2023 09:38 AM        CBC:  Lab Results   Component Value Date    WBC 5.7 10/14/2023    HGB 14.2 10/14/2023    HCT 44.0 10/14/2023    .0 10/14/2023    NEPERCENT 38.7 10/14/2023    LYPERCENT 48.7 10/14/2023    MOPERCENT 7.9 10/14/2023    EOPERCENT 3.5 10/14/2023    BAPERCENT 1.0 10/14/2023    NE 2.22 10/14/2023    LYMABS 2.79 10/14/2023    MOABSO 0.45 10/14/2023    EOABSO 0.20 10/14/2023    BAABSO 0.06 10/14/2023          Hemoglobin A1C, Microalbumin  Lab Results   Component Value Date/Time    A1C 6.1 (H) 08/29/2024 11:01 AM        Lipid panel  Lab Results   Component Value Date/Time    CHOLEST 164 10/14/2023 09:38 AM    HDL 53 10/14/2023 09:38 AM    TRIG 90 10/14/2023 09:38 AM    LDL 94 10/14/2023 09:38 AM    NONHDLC 111 10/14/2023 09:38 AM        Medications:  Current Outpatient Medications   Medication Sig Dispense Refill    metFORMIN  MG Oral Tablet 24 Hr Take 1 tablet (500 mg total) by mouth 2 (two) times daily with meals. 180 tablet 1    cyclobenzaprine 10 MG Oral Tab Take 1 tablet (10 mg total) by mouth nightly as needed. 30  tablet 1    naproxen 500 MG Oral Tab Take 1 tablet (500 mg total) by mouth 2 (two) times daily as needed. 60 tablet 1    ESCITALOPRAM 20 MG Oral Tab TAKE 1 TABLET BY MOUTH EVERY DAY 90 tablet 1    Lancets Micro Thin 33G Does not apply Misc 1 each 2 (two) times a day. 200 each 2    meclizine 25 MG Oral Tab Take 1 tablet (25 mg total) by mouth 3 (three) times daily as needed. 30 tablet 0    Ergocalciferol 2500 units Oral Cap Take 2 capsules by mouth daily. 180 capsule 0      PMH:  Past Medical History:    Anxiety    Cutaneous skin tags               REVIEW OF SYSTEMS:   Review of Systems   Constitutional:  Negative for chills, fatigue, fever and unexpected weight change.   HENT:  Negative for congestion, ear pain, hearing loss, rhinorrhea, sinus pain and sore throat.    Eyes:  Negative for pain, redness and visual disturbance.   Respiratory:  Negative for apnea, cough, chest tightness, shortness of breath and wheezing.    Cardiovascular:  Negative for chest pain, palpitations and leg swelling.   Gastrointestinal:  Negative for abdominal distention, abdominal pain, blood in stool, constipation and nausea.   Endocrine: Negative for cold intolerance, heat intolerance and polyuria.   Genitourinary:  Negative for dysuria, hematuria and urgency.   Musculoskeletal:  Negative for arthralgias, back pain, gait problem, joint swelling, myalgias and neck pain.   Skin:  Negative for rash and wound.   Allergic/Immunologic: Negative for food allergies and immunocompromised state.   Neurological:  Negative for dizziness, seizures, facial asymmetry, speech difficulty, weakness, light-headedness, numbness and headaches.   Hematological:  Negative for adenopathy. Does not bruise/bleed easily.   Psychiatric/Behavioral:  Negative for behavioral problems, sleep disturbance and suicidal ideas. The patient is not nervous/anxious.             PHYSICAL EXAM:   /70   Pulse 67   Ht 5' 7\" (1.702 m)   Wt 227 lb (103 kg)   SpO2 99%   BMI  35.55 kg/m²  Estimated body mass index is 35.55 kg/m² as calculated from the following:    Height as of this encounter: 5' 7\" (1.702 m).    Weight as of this encounter: 227 lb (103 kg).     Wt Readings from Last 3 Encounters:   10/17/24 227 lb (103 kg)   08/29/24 222 lb (100.7 kg)   10/17/23 230 lb (104.3 kg)       Physical Exam  Vitals reviewed.   Constitutional:       General: He is not in acute distress.     Appearance: He is well-developed.   HENT:      Head: Normocephalic and atraumatic.   Eyes:      Conjunctiva/sclera: Conjunctivae normal.      Pupils: Pupils are equal, round, and reactive to light.   Neck:      Thyroid: No thyromegaly.   Cardiovascular:      Rate and Rhythm: Normal rate and regular rhythm.      Heart sounds: Normal heart sounds, S1 normal and S2 normal. No murmur heard.     No friction rub. No gallop.   Pulmonary:      Effort: Pulmonary effort is normal. No respiratory distress.      Breath sounds: Normal breath sounds. No wheezing or rales.   Chest:      Chest wall: No tenderness.   Abdominal:      General: Bowel sounds are normal. There is no distension.      Palpations: Abdomen is soft. There is no mass.      Tenderness: There is no abdominal tenderness. There is no guarding or rebound.   Musculoskeletal:         General: No tenderness. Normal range of motion.      Cervical back: Normal range of motion.   Lymphadenopathy:      Cervical: No cervical adenopathy.   Skin:     General: Skin is warm.      Findings: No erythema or rash.   Neurological:      Mental Status: He is alert and oriented to person, place, and time.      Cranial Nerves: No cranial nerve deficit.      Deep Tendon Reflexes: Reflexes are normal and symmetric.   Psychiatric:         Behavior: Behavior normal.         Thought Content: Thought content normal.         Judgment: Judgment normal.         Bilateral barefoot skin diabetic exam is normal, visualized feet and the appearance is normal.  Bilateral monofilament/sensation  of both feet is normal.  Pulsation pedal pulse exam of both lower legs/feet is normal as well.          ASSESSMENT AND PLAN:   Patient is a 53 year old male who presents primarily presents for:    (Z00.00) Annual physical exam  (primary encounter diagnosis)  During the annual physical exam I spent extensive time discussing medical history including existing conditions, past surgeries, allergies and medications.  Lifestyle factors discussed including diet, exercise and substance abuse including alcohol and tobacco if warranted.  Counseled on screening tests based on age and underlying risk factors which may include but not limited to blood pressure measurement, cholesterol screening, diabetes screening and prostate cancer screening.  Preventative screenings discussed.  Reviewed immunizations.  Sexual health discussed if appropriate.  Family history reviewed.  I addressed any specific concerns or symptoms that the patient had.        (Z13.89) Nephropathy screen  Plan: Microalb/Creat Ratio, Random Urine            (E11.9) Type 2 diabetes mellitus without complication, without long-term current use of insulin (HCC)  Plan: Lipid Panel, Comp Metabolic Panel (14) [E]                      Health Maintenance:    Health Maintenance   Topic Date Due    Colorectal Cancer Screening  Never done    DTaP,Tdap,and Td Vaccines (1 - Tdap) Never done    COVID-19 Vaccine (4 - 2023-24 season) 09/01/2024    Influenza Vaccine (1) 10/01/2024    Diabetes Care: GFR  10/14/2024    Diabetes Care: Microalb/Creat Ratio  10/14/2024    Annual Physical  10/17/2024    Diabetes Care Foot Exam  10/17/2024    Diabetes Care A1C  02/28/2025    Diabetes Care Dilated Eye Exam  03/15/2025    PSA  10/14/2025    Annual Depression Screening  Completed    Pneumococcal Vaccine: Birth to 64yrs  Completed    Zoster Vaccines  Completed         Meds & Refills for this Visit:  Requested Prescriptions      No prescriptions requested or ordered in this encounter        Orders Placed This Encounter   Procedures    Lipid Panel    Comp Metabolic Panel (14) [E]    Microalb/Creat Ratio, Random Urine       Imaging & Consults:  None          Return in about 6 months (around 4/17/2025) for Symptom monitoring, Glucose monitoring.  Important follow up notes/labs for next visit      Patient indicates understanding of the above recommendations and agrees to the above plan.  Reasurrance and education provided. All questions answered.    Notified to call with any questions, complications, allergies, or worsening or changing symptoms as well as any side effects or complications from the treatments .  Red flags/ ER precautions discussed.    If diagnostic labs or imaging ordered advised patient to contact my office for results  24-48 hours after completion    This note was dictated using dragon speech recognition transcription software.  Typographical and transcription errors may be present.  Please call if any questions.             Cory Mendoza MD  [facility]                     Breath sounds clear and equal bilaterally.

## 2025-04-30 ENCOUNTER — OFFICE VISIT (OUTPATIENT)
Age: 55
End: 2025-04-30
Payer: COMMERCIAL

## 2025-04-30 VITALS
BODY MASS INDEX: 33.9 KG/M2 | WEIGHT: 216 LBS | DIASTOLIC BLOOD PRESSURE: 70 MMHG | TEMPERATURE: 97 F | SYSTOLIC BLOOD PRESSURE: 138 MMHG | HEIGHT: 67 IN | OXYGEN SATURATION: 99 % | HEART RATE: 88 BPM

## 2025-04-30 DIAGNOSIS — E11.9 TYPE 2 DIABETES MELLITUS WITHOUT COMPLICATION, WITHOUT LONG-TERM CURRENT USE OF INSULIN (HCC): Primary | ICD-10-CM

## 2025-04-30 LAB
CARTRIDGE EXPIRATION DATE: NORMAL DATE
HEMOGLOBIN A1C: 5.4 % (ref 4.3–5.6)

## 2025-04-30 RX ORDER — SEMAGLUTIDE 0.68 MG/ML
0.5 INJECTION, SOLUTION SUBCUTANEOUS WEEKLY
Qty: 1 EACH | Refills: 5 | Status: SHIPPED | OUTPATIENT
Start: 2025-04-30

## 2025-04-30 NOTE — PROGRESS NOTES
[facility] part of [facility]  Return Patient Progress Note      HPI:     Chief Complaint   Patient presents with    Follow - Up     6 month follow up       Bridger Ortiz is a 54 year old male presenting for:    Has a significant  has a past medical history of Anxiety, Cutaneous skin tags (08/24/2018), Esophageal reflux, Obesity, Sleep apnea, and Type 1 diabetes mellitus (HCC).    DM II on semaglutide.  A1c well controlled today at 5.4  Labs:   CMP:  Lab Results   Component Value Date/Time     04/19/2025 02:31 AM    K 3.8 04/19/2025 02:31 AM     04/19/2025 02:31 AM    CO2 24.0 04/19/2025 02:31 AM    CREATSERUM 0.87 04/19/2025 02:31 AM    CA 9.4 04/19/2025 02:31 AM     (H) 04/19/2025 02:31 AM    TP 7.3 10/17/2024 10:38 AM    ALB 4.6 10/17/2024 10:38 AM    ALKPHO 54 10/17/2024 10:38 AM    AST 22 10/17/2024 10:38 AM    ALT 37 10/17/2024 10:38 AM    BILT 0.7 10/17/2024 10:38 AM        CBC:  Lab Results   Component Value Date    WBC 9.8 04/19/2025    HGB 14.2 04/19/2025    HCT 41.1 04/19/2025    .0 04/19/2025    NEPERCENT 86.5 04/19/2025    LYPERCENT 11.5 04/19/2025    MOPERCENT 1.4 04/19/2025    EOPERCENT 0.0 04/19/2025    BAPERCENT 0.3 04/19/2025    NE 8.48 (H) 04/19/2025    LYMABS 1.13 04/19/2025    MOABSO 0.14 04/19/2025    EOABSO 0.00 04/19/2025    BAABSO 0.03 04/19/2025          Hemoglobin A1C, Microalbumin  Lab Results   Component Value Date/Time    A1C 6.1 (H) 08/29/2024 11:01 AM        Lipid panel  Lab Results   Component Value Date/Time    CHOLEST 154 10/17/2024 10:38 AM    HDL 51 10/17/2024 10:38 AM    TRIG 93 10/17/2024 10:38 AM    LDL 86 10/17/2024 10:38 AM    NONHDLC 103 10/17/2024 10:38 AM        Medications:  Current Medications[1]   PMH:  Past Medical History[2]            REVIEW OF SYSTEMS:   Review of Systems   Constitutional:  Negative for chills, fatigue, fever and unexpected weight change.   HENT:  Negative for congestion, ear pain, hearing loss,  rhinorrhea, sinus pain and sore throat.    Eyes:  Negative for pain, redness and visual disturbance.   Respiratory:  Negative for apnea, cough, chest tightness, shortness of breath and wheezing.    Cardiovascular:  Negative for chest pain, palpitations and leg swelling.   Gastrointestinal:  Negative for abdominal distention, abdominal pain, blood in stool, constipation and nausea.   Endocrine: Negative for cold intolerance, heat intolerance and polyuria.   Genitourinary:  Negative for dysuria, hematuria and urgency.   Musculoskeletal:  Negative for arthralgias, back pain, gait problem, joint swelling, myalgias and neck pain.   Skin:  Negative for rash and wound.   Allergic/Immunologic: Negative for food allergies and immunocompromised state.   Neurological:  Negative for dizziness, seizures, facial asymmetry, speech difficulty, weakness, light-headedness, numbness and headaches.   Hematological:  Negative for adenopathy. Does not bruise/bleed easily.   Psychiatric/Behavioral:  Negative for behavioral problems, sleep disturbance and suicidal ideas. The patient is not nervous/anxious.             PHYSICAL EXAM:   /70   Pulse 88   Temp 97.3 °F (36.3 °C)   Ht 5' 7\" (1.702 m)   Wt 216 lb (98 kg)   SpO2 99%   BMI 33.83 kg/m²  Estimated body mass index is 33.83 kg/m² as calculated from the following:    Height as of this encounter: 5' 7\" (1.702 m).    Weight as of this encounter: 216 lb (98 kg).     Wt Readings from Last 3 Encounters:   04/30/25 216 lb (98 kg)   04/19/25 219 lb (99.3 kg)   11/19/24 227 lb (103 kg)       Physical Exam  Vitals reviewed.   Constitutional:       General: He is not in acute distress.     Appearance: He is well-developed.   HENT:      Head: Normocephalic and atraumatic.   Eyes:      Conjunctiva/sclera: Conjunctivae normal.      Pupils: Pupils are equal, round, and reactive to light.   Neck:      Thyroid: No thyromegaly.   Cardiovascular:      Rate and Rhythm: Normal rate and regular  rhythm.      Heart sounds: Normal heart sounds, S1 normal and S2 normal. No murmur heard.     No friction rub. No gallop.   Pulmonary:      Effort: Pulmonary effort is normal. No respiratory distress.      Breath sounds: Normal breath sounds. No wheezing or rales.   Chest:      Chest wall: No tenderness.   Abdominal:      General: Bowel sounds are normal. There is no distension.      Palpations: Abdomen is soft. There is no mass.      Tenderness: There is no abdominal tenderness. There is no guarding or rebound.   Musculoskeletal:         General: No tenderness. Normal range of motion.      Cervical back: Normal range of motion.   Lymphadenopathy:      Cervical: No cervical adenopathy.   Skin:     General: Skin is warm.      Findings: No erythema or rash.   Neurological:      Mental Status: He is alert and oriented to person, place, and time.      Cranial Nerves: No cranial nerve deficit.      Deep Tendon Reflexes: Reflexes are normal and symmetric.   Psychiatric:         Behavior: Behavior normal.         Thought Content: Thought content normal.         Judgment: Judgment normal.         Bilateral barefoot skin diabetic exam is normal, visualized feet and the appearance is normal.  Bilateral monofilament/sensation of both feet is normal.  Pulsation pedal pulse exam of both lower legs/feet is normal as well.          ASSESSMENT AND PLAN:   Patient is a 54 year old male who presents primarily presents for:    (E11.9) Type 2 diabetes mellitus without complication, without long-term current use of insulin ([facility])  (primary encounter diagnosis)  Plan: POC Glycohemoglobin [83318], Microalb/Creat         Ratio, Random Urine       Continue semaglutide, metformin.  Well controlled.            Health Maintenance:    Health Maintenance   Topic Date Due    DTaP,Tdap,and Td Vaccines (1 - Tdap) Never done    COVID-19 Vaccine (4 - 2024-25 season) 09/01/2024    Diabetes Care: Foot Exam (Annual)  01/01/2025    Diabetes Care:  Microalb/Creat Ratio (Annual)  01/01/2025    Diabetes Care A1C  02/28/2025    Influenza Vaccine (Season Ended) 10/01/2025    PSA  10/14/2025    Annual Physical  10/17/2025    Diabetes Care Dilated Eye Exam  03/21/2026    Diabetes Care: GFR  04/19/2026    Colorectal Cancer Screening  11/27/2034    Annual Depression Screening  Completed    Pneumococcal Vaccine: 50+ Years  Completed    Zoster Vaccines  Completed    Meningococcal B Vaccine  Aged Out         Meds & Refills for this Visit:  Requested Prescriptions     Signed Prescriptions Disp Refills    semaglutide (OZEMPIC, 0.25 OR 0.5 MG/DOSE,) 2 MG/3ML Subcutaneous Solution Pen-injector 1 each 5     Sig: Inject 0.5 mg into the skin once a week.       Orders Placed This Encounter   Procedures    POC Glycohemoglobin [19715]    Microalb/Creat Ratio, Random Urine    TdaP (Adacel, Boostrix) [37576]       Imaging & Consults:  TETANUS, DIPHTHERIA TOXOIDS AND ACELLULAR PERTUSIS VACCINE (TDAP), >7 YEARS, IM USE          No follow-ups on file.  Important follow up notes/labs for next visit      Patient indicates understanding of the above recommendations and agrees to the above plan.  Reasurrance and education provided. All questions answered.    Notified to call with any questions, complications, allergies, or worsening or changing symptoms as well as any side effects or complications from the treatments .  Red flags/ ER precautions discussed.    If diagnostic labs or imaging ordered advised patient to contact my office for results  24-48 hours after completion    This note was dictated using dragon speech recognition transcription software.  Typographical and transcription errors may be present.  Please call if any questions.       As part of our commitment to providing you with comprehensive, transparent, and timely access to your health information, we adhere to the guidelines set forth by the 21st Century Cures Act. This Act enhances your rights to access your electronic  health information and ensures that you can easily obtain your medical records.  Please note that the verbage used in this note is intended for medical documentation and communication and may be interpreted as forthright.  Please do not hesitate to contact my office if you have any questions.            Cory Mendoza MD  [facility]                         [1]   Current Outpatient Medications   Medication Sig Dispense Refill    semaglutide (OZEMPIC, 0.25 OR 0.5 MG/DOSE,) 2 MG/3ML Subcutaneous Solution Pen-injector Inject 0.5 mg into the skin once a week. 1 each 5    promethazine 25 MG Oral Tab Take 1 tablet (25 mg total) by mouth every 6 (six) hours as needed (for dizziness). 20 tablet 0    METFORMIN  MG Oral Tablet 24 Hr TAKE 1 TABLET BY MOUTH TWICE A DAY WITH MEALS 180 tablet 1    benzonatate 200 MG Oral Cap Take 1 capsule (200 mg total) by mouth 3 (three) times daily as needed for cough. 15 capsule 0    CYCLOBENZAPRINE 10 MG Oral Tab TAKE 1 TABLET BY MOUTH EVERY DAY NIGHTLY AS NEEDED 30 tablet 1    NAPROXEN 500 MG Oral Tab TAKE 1 TABLET BY MOUTH TWICE A DAY AS NEEDED 60 tablet 1    ESCITALOPRAM 20 MG Oral Tab TAKE 1 TABLET BY MOUTH EVERY DAY 90 tablet 1    Lancets Micro Thin 33G Does not apply Misc 1 each 2 (two) times a day. 200 each 2    Ergocalciferol 2500 units Oral Cap Take 2 capsules by mouth daily. 180 capsule 0   [2]   Past Medical History:   Anxiety    Cutaneous skin tags    Esophageal reflux    Obesity    Sleep apnea    Type 1 diabetes mellitus (HCC)

## 2025-05-28 ENCOUNTER — PATIENT MESSAGE (OUTPATIENT)
Age: 55
End: 2025-05-28

## 2025-07-06 ENCOUNTER — PATIENT MESSAGE (OUTPATIENT)
Age: 55
End: 2025-07-06

## 2025-07-10 NOTE — TELEPHONE ENCOUNTER
Pt's insurance has been updated and pt is calling wanting to know if new prescription would need to be sent to [facility] due to new insurance [employer].       Please call and advise

## 2025-07-23 ENCOUNTER — TELEPHONE (OUTPATIENT)
Age: 55
End: 2025-07-23

## 2025-07-23 DIAGNOSIS — E11.9 TYPE 2 DIABETES MELLITUS WITHOUT COMPLICATION, WITHOUT LONG-TERM CURRENT USE OF INSULIN (HCC): Primary | ICD-10-CM

## 2025-07-23 RX ORDER — SEMAGLUTIDE 0.68 MG/ML
0.5 INJECTION, SOLUTION SUBCUTANEOUS WEEKLY
Qty: 3 ML | Refills: 5 | Status: SHIPPED | OUTPATIENT
Start: 2025-07-23

## 2025-07-23 NOTE — TELEPHONE ENCOUNTER
Pt notified via [employer] that Ozempic PA is in progress w/ [facility] under new [employer] insurance plan.

## 2025-07-23 NOTE — TELEPHONE ENCOUNTER
Message routed to DY/ [facility] team to inform pt requested refill dose was for the 1mg/ week (4mg/ 3ml) so correction in refill could be made.    Carlypsohart message sent to pt to confirm that yes- Rx to be corrected by refill team and will need to await outcome of Keith COHEN.

## 2025-07-23 NOTE — TELEPHONE ENCOUNTER
Approved    Prior authorization approved  Payer: Misty Case ID: [id number]  Note from payer: PA Case: [id number], Status: Approved, Coverage Starts on: 7/23/2025 12:00:00 AM, Coverage Ends on: 7/23/2026 12:00:00 AM.  Approval Details    Authorization number: [id number]  Authorized from July 23, 2025 to July 23, 2026

## 2025-07-24 DIAGNOSIS — E11.9 TYPE 2 DIABETES MELLITUS WITHOUT COMPLICATION, WITHOUT LONG-TERM CURRENT USE OF INSULIN (HCC): ICD-10-CM

## 2025-07-24 NOTE — TELEPHONE ENCOUNTER
Please send in the correct dose of medication so a prior auth can be done. The request was for the active script on file which was for ozempic 2 mg/ 3ml pen injectors.

## 2025-07-24 NOTE — TELEPHONE ENCOUNTER
Pratibha Jama, RN  Susannah Gonzalez, JACKIE  See patient's request for refill of the 1mg strength Ozempic (4mg/3ml)-- appears the lower dose of 0.5mg was refilled and sent for [employer] PA??

## 2025-07-24 NOTE — TELEPHONE ENCOUNTER
Close reason: Other   Note from payer: The member recently filled this medication and will be able to return for their next refill according to their  plan limits. -     2mg was picked up and dispensed 7/24/25

## (undated) NOTE — LETTER
Date: 3/16/2020    Patient Name: Ariadne Waters          To Whom It May Concern: This letter has been written at the patient's request. The above patient was triaged at the [facility] for treatment of a medical condition.     This patient sh

## (undated) NOTE — LETTER
Date & Time: 1/8/2025, 3:41 PM  Patient: Bridger Ortiz  Encounter Provider(s):    Sabino Christensen APRN       To Whom It May Concern:    Bridger Ortiz was seen and treated in our department on 1/8/2025. He should not return to work until 1/13/25 .    If you have any questions or concerns, please do not hesitate to call.        _____________________________  Physician/APC Signature

## (undated) NOTE — LETTER
Bin Grover, [address]  [address]       09/06/18        Patient: Indu Lee   YOB: 1970   Date of Visit: 9/6/2018       Dear  Dr. Syed Yeboah MD,      Thank you for referring Indu Lee to my practice.